# Patient Record
Sex: MALE | Race: WHITE | NOT HISPANIC OR LATINO | Employment: FULL TIME | URBAN - METROPOLITAN AREA
[De-identification: names, ages, dates, MRNs, and addresses within clinical notes are randomized per-mention and may not be internally consistent; named-entity substitution may affect disease eponyms.]

---

## 2017-01-31 ENCOUNTER — OFFICE VISIT (OUTPATIENT)
Dept: NEUROLOGY | Facility: MEDICAL CENTER | Age: 42
End: 2017-01-31
Payer: COMMERCIAL

## 2017-01-31 VITALS
HEIGHT: 69 IN | DIASTOLIC BLOOD PRESSURE: 68 MMHG | SYSTOLIC BLOOD PRESSURE: 122 MMHG | OXYGEN SATURATION: 91 % | WEIGHT: 178 LBS | BODY MASS INDEX: 26.36 KG/M2 | HEART RATE: 82 BPM | TEMPERATURE: 99 F

## 2017-01-31 PROCEDURE — 99214 OFFICE O/P EST MOD 30 MIN: CPT | Performed by: PSYCHIATRY & NEUROLOGY

## 2017-01-31 RX ORDER — OXCARBAZEPINE 300 MG/1
300 TABLET, FILM COATED ORAL 2 TIMES DAILY
Qty: 180 TAB | Refills: 1 | Status: SHIPPED | OUTPATIENT
Start: 2017-01-31 | End: 2017-07-28 | Stop reason: SDUPTHER

## 2017-01-31 NOTE — MR AVS SNAPSHOT
"        Amadeo Fernandez   2017 8:40 AM   Office Visit   MRN: [id number]    Department:  [facility]   Dept Phone:  [phone]    Description:  Male : 1975   Provider:  Yue Cheng M.D.           Reason for Visit     Follow-Up spells      Allergies as of 2017     No Known Allergies      You were diagnosed with     Spells   [[id number]]         Vital Signs     Blood Pressure Pulse Temperature Height Weight Body Mass Index    122/68 mmHg 82 37.2 °C (99 °F) 1.753 m (5' 9.02\") 80.74 kg (178 lb) 26.27 kg/m2    Oxygen Saturation                   91%           Basic Information     Date Of Birth Sex Race Ethnicity Preferred Language    1975 Male White Non- English      Your appointments     2017  9:30 AM   EST IVIG with RN 3   [facility] ([facility])    [address] [facility] [address]  [place] [id number] [phone]            2017  8:20 AM   Follow Up Visit with Yue Cheng M.D.   [facility] Neu[facility] (--)    [address] [place] [address], [address]  [place] [id number] [phone]           You will be receiving a confirmation call a few days before your appointment from our automated call confirmation system.              Problem List              ICD-10-CM Priority Class Noted - Resolved    Spells R68.89   2016 - Present      Health Maintenance        Date Due Completion Dates    IMM DTaP/Tdap/Td Vaccine (1 - Tdap) 10/17/1996 ---    IMM INFLUENZA (1) 2016 ---            Current Immunizations     No immunizations on file.      Below and/or attached are the medications your provider expects you to take. Review all of your home medications and newly ordered medications with your provider and/or pharmacist. Follow medication instructions as directed by your provider and/or pharmacist. Please keep your medication list with you and share with your provider. Update the information when medications are discontinued, doses are changed, or new medications (including " over-the-counter products) are added; and carry medication information at all times in the event of emergency situations     Allergies:  No Known Allergies          Medications  Valid as of: January 31, 2017 -  8:58 AM    Generic Name Brand Name Tablet Size Instructions for use    Albuterol   Inhale  by mouth.        Cetirizine HCl (Cap) Cetirizine HCl 10 MG Take  by mouth.        NS SOLN 60 mL with albuterol 2.5 mg/0.5 mL NEBU 5 mL   5 mg/hr by Nebulization route.        OXcarbazepine (Tab) TRILEPTAL 300 MG Take 1 Tab by mouth 2 times a day.        Sertraline HCl (Tab) ZOLOFT 100 MG Take 150 mg by mouth every day.        .                 Medicines prescribed today were sent to:     [facility] - [place], [place] - [address]    [address] [place] [address]    Phone: [phone] Fax: [phone]    Open 24 Hours?: No      Medication refill instructions:       If your prescription bottle indicates you have medication refills left, it is not necessary to call your provider’s office. Please contact your pharmacy and they will refill your medication.    If your prescription bottle indicates you do not have any refills left, you may request refills at any time through one of the following ways: The online [employer] system (except Urgent Care), by calling your provider’s office, or by asking your pharmacy to contact your provider’s office with a refill request. Medication refills are processed only during regular business hours and may not be available until the next business day. Your provider may request additional information or to have a follow-up visit with you prior to refilling your medication.   *Please Note: Medication refills are assigned a new Rx number when refilled electronically. Your pharmacy may indicate that no refills were authorized even though a new prescription for the same medication is available at the pharmacy. Please request the medicine by name with the pharmacy before contacting your  provider for a refill.        Referral     A referral request has been sent to our patient care coordination department. Please allow 3-5 business days for us to process this request and contact you either by phone or mail. If you do not hear from us by the 5th business day, please call us at [phone].        Instructions      IMPRESSION:    1. Two spells of shaking and falls-- the first one was 2005, the last one was Nov 2015-- patient denied passing out  2.  Hx of Brain contusion  3.  One episode of urine incontinence followed by tingling of limbs yesterday  4.  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

## 2017-01-31 NOTE — PATIENT INSTRUCTIONS
IMPRESSION:    1. Two spells of shaking and falls-- the first one was 2005, the last one was Nov 2015-- patient denied passing out  2.  Hx of Brain contusion  3.  One episode of urine incontinence followed by tingling of limbs yesterday  4.  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

## 2017-01-31 NOTE — PROGRESS NOTES
NEUROLOGY NOTE    Referring Physician  Dr Man    CHIEF COMPLAINT:    No more spells with trileptal    One episode of falling and shaking-- 2005 and Nov 2015-- lasting for a couple of seconds   Since last visit, he developed one spell-- all limbs were tingling and urine intolerance  Chief Complaint   Patient presents with   • Follow-Up     spells       PRESENT ILLNESS:   No more spells with trileptal    He did not enjoy the zonegran well-- zonegran 200mg HS made him not able to think clearly    He developed one spell-- all limbs were tingling and urine intolerance-- he denied passing out    The last scalp EEG and MRI were not remarkable    One episode of falling and shaking-- 2005 and Nov 2015-- lasting for a couple of seconds   Hx of brain concussion---6 times --the worse one, he passed out for 2 days  He thought that could be not associated with loss of consciciouness    He was put on keppra--he took it once and did not feel right and stopped by himself    PAST MEDICAL HISTORY:  No past medical history on file.    PAST SURGICAL HISTORY:  No past surgical history on file.    FAMILY HISTORY:  No family history on file.    SOCIAL HISTORY:  Social History     Social History   • Marital Status:      Spouse Name: N/A   • Number of Children: N/A   • Years of Education: N/A     Occupational History   • Not on file.     Social History Main Topics   • Smoking status: Not on file   • Smokeless tobacco: Not on file   • Alcohol Use: Not on file   • Drug Use: Not on file   • Sexual Activity: Not on file     Other Topics Concern   • Not on file     Social History Narrative   • No narrative on file     ALLERGIES:  No Known Allergies  TOBHX  History   Smoking status   • Not on file   Smokeless tobacco   • Not on file     ALCHX  History   Alcohol Use: Not on file     DRUGHX  History   Drug Use Not on file           MEDICATIONS:  Current Outpatient Prescriptions   Medication   • oxcarbazepine (TRILEPTAL) 300 MG Tab   •  Cetirizine HCl 10 MG Cap   • sertraline (ZOLOFT) 100 MG Tab   • NS SOLN 60 mL with albuterol 2.5 mg/0.5 mL NEBU 5 mL   • ALBUTEROL INH     No current facility-administered medications for this visit.       REVIEW OF SYSTEM:    Constitutional: Denies fevers, Denies weight changes   Eyes: Denies changes in vision, no eye pain   Ears/Nose/Throat/Mouth: Denies nasal congestion or sore throat   Cardiovascular: Denies chest pain or palpitations   Respiratory: Denies SOB.   Gastrointestinal/Hepatic: Denies abdominal pain, nausea, vomiting, diarrhea, constipation or GI bleeding   Genitourinary: Denies bladder dysfunction, dysuria or frequency   Musculoskeletal/Rheum: Denies joint pain and swelling   Skin/Breast: Denies rash, denies breast lumps or discharge   Neurological: 2 spells of shaking and falling , head injury before  Psychiatric: denies mood disorder   Endocrine: denies hx of diabetes or thyroid dysfunction   Heme/Oncology/Lymph Nodes: Denies enlarged lymph nodes, denies brusing or known bleeding disorder   Allergic/Immunologic: Denies hx of allergies         PHYSICAL AND NEUROLOGICAL EXMAINATIONS:  VITAL SIGNS: There were no vitals taken for this visit.  CURRENT WEIGHT:   BMI: There is no weight on file to calculate BMI.  PREVIOUS WEIGHTS:  Wt Readings from Last 25 Encounters:   11/08/16 80.6 kg (177 lb 11.1 oz)   07/07/16 80.287 kg (177 lb)   03/04/16 78.472 kg (173 lb)   01/27/16 75.751 kg (167 lb)       General appearance of patient: WDWN(+) NAD(+)    EYES  o Fundus : Papilledem(-) Exudates(-) Hemorrhage(-)  Nervous System  Orientation to time, place and person(+)  Memory normal(-) Recall 1/3  Language: aphasia(-)  Knowledge: past(+) Current(+)  Attention(+)  Cranial Nerves  • Nerve 2: intact  • Nerve 3,4,6: intact  • Nerve 5 : intact  • Nerve 7: intact  • Nerve 8: intact  • Nerve 9 & 10: intact  • Nerve 11: intact  • Nerve 12: intact  Muscle Power and muscle tone: symmetric, normal in upper and lower  Sensory  System: Pin sensation intact(+)  Reflexes: symmetric throughout  Cerebellar Function FNP normal   Gait : Steady(+) TandemGait steady(+)  Heart and Vascular  Peripheral Vasucular system : Edema (-) Swelling(-)  RHB, Breathing sound clear  abdomen bowel sound normoactive  Extremities freely moveable  Joints no contracture     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 Two spells of shaking and falls-- the first one was 2005, the last one was Nov 2015-- patient denied passing out  2.  Hx of Brain contusion  3.  One episode of urine incontinence followed by tingling of limbs yesterday  4.  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   Clinical correlation may help.    EEG 2/17/2016--- not remarkable in my opinions--- different interpretation than that of Dr Bloch  However, normal scalp EEG does not exclude diagnosis of seizure    CLINICAL INTERPRETATION: This is an abnormal electroencephalogram due to the   left temporal sharp wave which may be indicative of underlying temporal lobe   epilepsy. Clinical correlation is  required.  ____________________________________  MELISSA P. BLOCH, MD          MRI of brain 2/4/2016-- not remarkable

## 2017-02-07 ENCOUNTER — OUTPATIENT INFUSION SERVICES (OUTPATIENT)
Dept: ONCOLOGY | Facility: MEDICAL CENTER | Age: 42
End: 2017-02-07
Attending: NURSE PRACTITIONER
Payer: COMMERCIAL

## 2017-02-07 NOTE — PROGRESS NOTES
Pt did not show for appt. Pt reports that MD office was supposed to cancel appts, no longer receiving IVIG.

## 2017-02-21 ENCOUNTER — HOSPITAL ENCOUNTER (OUTPATIENT)
Dept: LAB | Facility: MEDICAL CENTER | Age: 42
End: 2017-02-21
Attending: NURSE PRACTITIONER
Payer: COMMERCIAL

## 2017-02-21 PROCEDURE — 36415 COLL VENOUS BLD VENIPUNCTURE: CPT

## 2017-02-21 PROCEDURE — 82785 ASSAY OF IGE: CPT

## 2017-02-21 PROCEDURE — 82784 ASSAY IGA/IGD/IGG/IGM EACH: CPT | Mod: 91

## 2017-02-21 PROCEDURE — 82784 ASSAY IGA/IGD/IGG/IGM EACH: CPT

## 2017-02-23 LAB
IGA SERPL-MCNC: 177 MG/DL (ref 68–408)
IGE SERPL-ACNC: 84 KU/L
IGG SERPL-MCNC: 655 MG/DL (ref 768–1632)
IGM SERPL-MCNC: 40 MG/DL (ref 35–263)

## 2017-03-31 ENCOUNTER — NON-PROVIDER VISIT (OUTPATIENT)
Dept: NEUROLOGY | Facility: MEDICAL CENTER | Age: 42
End: 2017-03-31
Payer: COMMERCIAL

## 2017-03-31 PROCEDURE — 95816 EEG AWAKE AND DROWSY: CPT | Performed by: PSYCHIATRY & NEUROLOGY

## 2017-03-31 NOTE — MR AVS SNAPSHOT
Amadeo Fernandez   3/31/2017 7:30 AM   Non-Provider Visit   MRN: [id number]    Department:  [facility]   Dept Phone:  [phone]    Description:  Male : 1975   [place]  [facility]           Reason for Visit     Procedure           Allergies as of 3/31/2017     No Known Allergies      You were diagnosed with     Spells   [[id number]]         Basic Information     Date Of Birth Sex Race Ethnicity Preferred Language    1975 Male White Non- English      Your appointments     2017 11:00 AM   Follow Up Visit with Olamide Judd PA-C   [facility] Neurology (--)    [address], [address]  [facility] [address] [phone]           You will be receiving a confirmation call a few days before your appointment from our automated call confirmation system.            2017 10:00 AM   Follow Up Visit with Yue Cheng M.D.   [facility] (--)    [address] [facility], [address]  [place] [address] [phone]           You will be receiving a confirmation call a few days before your appointment from our automated call confirmation system.              Problem List              ICD-10-CM Priority Class Noted - Resolved    Spells R68.89   2016 - Present      Health Maintenance        Date Due Completion Dates    IMM DTaP/Tdap/Td Vaccine (1 - Tdap) 10/17/1996 ---    IMM INFLUENZA (1) 2016 ---            Current Immunizations     No immunizations on file.      Below and/or attached are the medications your provider expects you to take. Review all of your home medications and newly ordered medications with your provider and/or pharmacist. Follow medication instructions as directed by your provider and/or pharmacist. Please keep your medication list with you and share with your provider. Update the information when medications are discontinued, doses are changed, or new medications (including over-the-counter products) are added; and  carry medication information at all times in the event of emergency situations     Allergies:  No Known Allergies          Medications  Valid as of: March 31, 2017 -  2:06 PM    Generic Name Brand Name Tablet Size Instructions for use    Albuterol   Inhale  by mouth.        Cetirizine HCl (Cap) Cetirizine HCl 10 MG Take  by mouth.        NS SOLN 60 mL with albuterol 2.5 mg/0.5 mL NEBU 5 mL   5 mg/hr by Nebulization route.        OXcarbazepine (Tab) TRILEPTAL 300 MG Take 1 Tab by mouth 2 times a day.        Sertraline HCl (Tab) ZOLOFT 100 MG Take 150 mg by mouth every day.        .                 Medicines prescribed today were sent to:     [facility] - [place], [place] - [address]    [address] [place] [address]    Phone: [phone] Fax: [phone]    Open 24 Hours?: No      Medication refill instructions:       If your prescription bottle indicates you have medication refills left, it is not necessary to call your provider’s office. Please contact your pharmacy and they will refill your medication.    If your prescription bottle indicates you do not have any refills left, you may request refills at any time through one of the following ways: The online [employer] system (except Urgent Care), by calling your provider’s office, or by asking your pharmacy to contact your provider’s office with a refill request. Medication refills are processed only during regular business hours and may not be available until the next business day. Your provider may request additional information or to have a follow-up visit with you prior to refilling your medication.   *Please Note: Medication refills are assigned a new Rx number when refilled electronically. Your pharmacy may indicate that no refills were authorized even though a new prescription for the same medication is available at the pharmacy. Please request the medicine by name with the pharmacy before contacting your provider for a refill.           [employer]  Status: Patient Declined

## 2017-04-03 NOTE — PROCEDURES
DATE OF SERVICE:  03/31/2017    ROUTINE ELECTROENCEPHALOGRAM REPORT        INDICATION:       The patient has a history of seizure.  Memory loss and increased myoclonus/ jerking in sleep.    TECHNIQUE: 30 channel routine electroencephalogram (EEG) was performed in accordance with the international 10-20 system. The study was reviewed in bipolar and referential montages. The recording examined the patient during wakeful and drowsy state(s).     DESCRIPTION OF THE RECORD:      The EEG background consists of posterior dominant alpha rhythm 10-11 Hz, alpha  rhythm, normal reactivity to eye opening.  There are no epileptiform   discharges.  There are no focal data slowing.  Stage II sleep was obtained.    Activation procedure did not produce any new abnormality.    ACTIVATION PROCEDURES:      Hyperventilation and Photic Stimulation were done    ICTAL AND/OR INTERICTAL FINDINGS:    No focal or generalized epileptiform activity noted. No regional slowing was seen during this routine study.  No clinical events or seizures were reported or recorded during the study.      EKG: sampling of the EKG recording demonstrated sinus rhythm.        INTERPRETATION:        INTERPRETATION:  This routine scalp EEG is still within normal limit.    Of note, unremarkable EEG does not completely exclude the diagnosis  of seizures since seizure is an episodic phenomena.  Clinical correlation may help     If clinical suspicion of seizure remains high.  Prolonged outpatient EEG   monitoring may be of help.           ____________________________________     MD RICK WILSON / BRIAN    DD:  04/03/2017 00:20:42  DT:  04/03/2017 01:36:40    D#:  [id number]  Job#:  [id number]

## 2017-04-11 ENCOUNTER — OFFICE VISIT (OUTPATIENT)
Dept: NEUROLOGY | Facility: MEDICAL CENTER | Age: 42
End: 2017-04-11
Payer: COMMERCIAL

## 2017-04-11 PROCEDURE — 99213 OFFICE O/P EST LOW 20 MIN: CPT | Performed by: PHYSICIAN ASSISTANT

## 2017-04-11 NOTE — MR AVS SNAPSHOT
Amadeo Fernandez   2017 11:00 AM   Office Visit   MRN: [id number]    Department:  [facility]   Dept Phone:  [phone]    Description:  Male : 1975   Provider:  Olamide Judd PA-C           Reason for Visit     Seizure DMV paper work      Allergies as of 2017     No Known Allergies      Basic Information     Date Of Birth Sex Race Ethnicity Preferred Language    1975 Male White Non- English      Your appointments     2017 10:00 AM   Follow Up Visit with Yue Cheng M.D.   [facility] Neurology (--)    [address] [facility], [address]  [facility] [address] [phone]           You will be receiving a confirmation call a few days before your appointment from our automated call confirmation system.              Problem List              ICD-10-CM Priority Class Noted - Resolved    Spells R68.89   2016 - Present      Health Maintenance        Date Due Completion Dates    IMM DTaP/Tdap/Td Vaccine (1 - Tdap) 10/17/1996 ---            Current Immunizations     No immunizations on file.      Below and/or attached are the medications your provider expects you to take. Review all of your home medications and newly ordered medications with your provider and/or pharmacist. Follow medication instructions as directed by your provider and/or pharmacist. Please keep your medication list with you and share with your provider. Update the information when medications are discontinued, doses are changed, or new medications (including over-the-counter products) are added; and carry medication information at all times in the event of emergency situations     Allergies:  No Known Allergies          Medications  Valid as of: 2017 - 11:10 AM    Generic Name Brand Name Tablet Size Instructions for use    Albuterol   Inhale  by mouth.        Cetirizine HCl (Cap) Cetirizine HCl 10 MG Take  by mouth.        NS SOLN 60 mL with albuterol 2.5 mg/0.5 mL NEBU 5 mL   5 mg/hr  by Nebulization route.        OXcarbazepine (Tab) TRILEPTAL 300 MG Take 1 Tab by mouth 2 times a day.        Sertraline HCl (Tab) ZOLOFT 100 MG Take 150 mg by mouth every day.        .                 Medicines prescribed today were sent to:     [facility] - [place] [place] [facility] [address]    [address] [place] [address]    Phone: [phone] Fax: [phone]    Open 24 Hours?: No      Medication refill instructions:       If your prescription bottle indicates you have medication refills left, it is not necessary to call your provider’s office. Please contact your pharmacy and they will refill your medication.    If your prescription bottle indicates you do not have any refills left, you may request refills at any time through one of the following ways: The online [facility] system (except Urgent Care), by calling your provider’s office, or by asking your pharmacy to contact your provider’s office with a refill request. Medication refills are processed only during regular business hours and may not be available until the next business day. Your provider may request additional information or to have a follow-up visit with you prior to refilling your medication.   *Please Note: Medication refills are assigned a new Rx number when refilled electronically. Your pharmacy may indicate that no refills were authorized even though a new prescription for the same medication is available at the pharmacy. Please request the medicine by name with the pharmacy before contacting your provider for a refill.           [employer] Status: Patient Declined

## 2017-04-11 NOTE — PROGRESS NOTES
Cc:  - abnormal spell    Established patient of [facility]  who suffers from spells/possible seizures  Here today for completion of paperwork for FMLA/disability/related to their condition.    They report to me that they are > 1 year from their last episode and are compliant with their medication.    Review of the medical chart and interviewing patient, I completed paperwork and it is scanned into media.     RTC as previously scheduled.    Total time with this visit: 15    Minutes face-to-face with patient. More than 50% of this visit was spent reviewing medical chart and speaking with the patient about their condition and how it affects their ability to do their job.

## 2017-04-17 ENCOUNTER — OFFICE VISIT (OUTPATIENT)
Dept: NEUROLOGY | Facility: MEDICAL CENTER | Age: 42
End: 2017-04-17
Payer: COMMERCIAL

## 2017-04-17 VITALS
TEMPERATURE: 97.8 F | RESPIRATION RATE: 16 BRPM | OXYGEN SATURATION: 95 % | HEIGHT: 69 IN | WEIGHT: 177.8 LBS | HEART RATE: 77 BPM | SYSTOLIC BLOOD PRESSURE: 122 MMHG | DIASTOLIC BLOOD PRESSURE: 60 MMHG | BODY MASS INDEX: 26.33 KG/M2

## 2017-04-17 PROCEDURE — 99214 OFFICE O/P EST MOD 30 MIN: CPT | Performed by: PSYCHIATRY & NEUROLOGY

## 2017-04-17 NOTE — PROGRESS NOTES
NEUROLOGY NOTE    Referring Physician  Dr Man    CHIEF COMPLAINT:    No more spells with trileptal    Had some trouble of memory problems for years    One episode of falling and shaking-- 2005 and Nov 2015-- lasting for a couple of seconds   Since last visit, he developed one spell-- all limbs were tingling and urine intolerance  Chief Complaint   Patient presents with   • Follow-Up     Spells       PRESENT ILLNESS:   No more spells with trileptal  Had some trouble of memory problems for years    He did not enjoy the zonegran well-- zonegran 200mg HS made him not able to think clearly    He developed one spell-- all limbs were tingling and urine intolerance-- he denied passing out    The last scalp EEG and MRI were not remarkable    One episode of falling and shaking-- 2005 and Nov 2015-- lasting for a couple of seconds   Hx of brain concussion---6 times --the worse one, he passed out for 2 days  He thought that could be not associated with loss of consciciouness    He was put on keppra--he took it once and did not feel right and stopped by himself    PAST MEDICAL HISTORY:  No past medical history on file.    PAST SURGICAL HISTORY:  No past surgical history on file.    FAMILY HISTORY:  No family history on file.    SOCIAL HISTORY:  Social History     Social History   • Marital Status:      Spouse Name: N/A   • Number of Children: N/A   • Years of Education: N/A     Occupational History   • Not on file.     Social History Main Topics   • Smoking status: Not on file   • Smokeless tobacco: Not on file   • Alcohol Use: Not on file   • Drug Use: Not on file   • Sexual Activity: Not on file     Other Topics Concern   • Not on file     Social History Narrative   • No narrative on file     ALLERGIES:  No Known Allergies  TOBHX  History   Smoking status   • Not on file   Smokeless tobacco   • Not on file     ALCHX  History   Alcohol Use: Not on file     DRUGHX  History   Drug Use Not on file  "          MEDICATIONS:  Current Outpatient Prescriptions   Medication   • oxcarbazepine (TRILEPTAL) 300 MG Tab   • Cetirizine HCl 10 MG Cap   • sertraline (ZOLOFT) 100 MG Tab   • NS SOLN 60 mL with albuterol 2.5 mg/0.5 mL NEBU 5 mL   • ALBUTEROL INH     No current facility-administered medications for this visit.       REVIEW OF SYSTEM:    Constitutional: Denies fevers, Denies weight changes   Eyes: Denies changes in vision, no eye pain   Ears/Nose/Throat/Mouth: Denies nasal congestion or sore throat   Cardiovascular: Denies chest pain or palpitations   Respiratory: Denies SOB.   Gastrointestinal/Hepatic: Denies abdominal pain, nausea, vomiting, diarrhea, constipation or GI bleeding   Genitourinary: Denies bladder dysfunction, dysuria or frequency   Musculoskeletal/Rheum: Denies joint pain and swelling   Skin/Breast: Denies rash, denies breast lumps or discharge   Neurological: 2 spells of shaking and falling , head injury before  Psychiatric: denies mood disorder   Endocrine: denies hx of diabetes or thyroid dysfunction   Heme/Oncology/Lymph Nodes: Denies enlarged lymph nodes, denies brusing or known bleeding disorder   Allergic/Immunologic: Denies hx of allergies         PHYSICAL AND NEUROLOGICAL EXMAINATIONS:  VITAL SIGNS: /60 mmHg  Pulse 77  Temp(Src) 36.6 °C (97.8 °F)  Resp 16  Ht 1.753 m (5' 9.02\")  Wt 80.65 kg (177 lb 12.8 oz)  BMI 26.24 kg/m2  SpO2 95%  CURRENT WEIGHT:   BMI: Body mass index is 26.24 kg/(m^2).  PREVIOUS WEIGHTS:  Wt Readings from Last 25 Encounters:   04/17/17 80.65 kg (177 lb 12.8 oz)   01/31/17 80.74 kg (178 lb)   11/08/16 80.6 kg (177 lb 11.1 oz)   07/07/16 80.287 kg (177 lb)   03/04/16 78.472 kg (173 lb)   01/27/16 75.751 kg (167 lb)       General appearance of patient: WDWN(+) NAD(+)    EYES  o Fundus : Papilledem(-) Exudates(-) Hemorrhage(-)  Nervous System  Orientation to time, place and person(+)  Memory normal(-) Recall 1/3  Language: aphasia(-)  Knowledge: past(+) " Current(+)  Attention(+)  Cranial Nerves  • Nerve 2: intact  • Nerve 3,4,6: intact  • Nerve 5 : intact  • Nerve 7: intact  • Nerve 8: intact  • Nerve 9 & 10: intact  • Nerve 11: intact  • Nerve 12: intact  Muscle Power and muscle tone: symmetric, normal in upper and lower  Sensory System: Pin sensation intact(+)  Reflexes: symmetric throughout  Cerebellar Function FNP normal   Gait : Steady(+) TandemGait steady(+)  Heart and Vascular  Peripheral Vasucular system : Edema (-) Swelling(-)  RHB, Breathing sound clear  abdomen bowel sound normoactive  Extremities freely moveable  Joints no contracture     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 Two spells of shaking and falls-- the first one was 2005, the last one was Nov 2015-- patient denied passing out  2.  Hx of Brain contusion  3.  One episode of urine incontinence followed by tingling of limbs yesterday--not lately  4.  Memory deterioration for years-- spells of day dreaming but not passing out-- not lately    PLAN/RECOMMENDATIONS:      Continue the Trileptal  300mg two times per day  Denied spells of passing out or starring    We also discuss about the benefits and risks about taking Trileptal vs not taking Trileptal  The spells of shaking did not happen since Trileptal was on board  Without Trileptal, the spells might come back though           Will see Childs in 6 months  If the memory continue to be a problem, we could offer repeat MRI of brain or even 5 days inpatient video EEG in the future if necessary         INTERPRETATION:        INTERPRETATION:  This routine scalp EEG is still within normal limit.    Of note, unremarkable EEG does not completely exclude the diagnosis  of seizures since seizure is an episodic phenomena.  Clinical correlation may help      If clinical suspicion of seizure remains high.  Prolonged outpatient EEG    monitoring may be  of help.           ____________________________________     MD RICK WILSON / BRIAN    DD:  04/03/2017 00:20:42  DT:  04/03/2017 01:36:40      MRI of brain 2/4/2016-- not remarkable

## 2017-04-17 NOTE — MR AVS SNAPSHOT
"        Amadeo Fernandez   2017 10:00 AM   Office Visit   MRN: [id number]    Department:  Neurology [facility]   Dept Phone:  [phone]    Description:  Male : 1975   Provider:  Yue Cheng M.D.           Reason for Visit     Follow-Up Spells      Allergies as of 2017     No Known Allergies      You were diagnosed with     Spells   [[id number]]         Vital Signs     Blood Pressure Pulse Temperature Respirations Height Weight    122/60 mmHg 77 36.6 °C (97.8 °F) 16 1.753 m (5' 9.02\") 80.65 kg (177 lb 12.8 oz)    Body Mass Index Oxygen Saturation                26.24 kg/m2 95%          Basic Information     Date Of Birth Sex Race Ethnicity Preferred Language    1975 Male White Non- English      Your appointments     Oct 16, 2017  9:20 AM   Follow Up Visit with Yue Cheng M.D.   [facility] (--)    [address] [place] [address], [address]  [facility] [id number] [phone]           You will be receiving a confirmation call a few days before your appointment from our automated call confirmation system.              Problem List              ICD-10-CM Priority Class Noted - Resolved    Spells R68.89   2016 - Present      Health Maintenance        Date Due Completion Dates    IMM DTaP/Tdap/Td Vaccine (1 - Tdap) 10/17/1996 ---            Current Immunizations     No immunizations on file.      Below and/or attached are the medications your provider expects you to take. Review all of your home medications and newly ordered medications with your provider and/or pharmacist. Follow medication instructions as directed by your provider and/or pharmacist. Please keep your medication list with you and share with your provider. Update the information when medications are discontinued, doses are changed, or new medications (including over-the-counter products) are added; and carry medication information at all times in the event of emergency situations     Allergies:  No Known Allergies       "   Medications  Valid as of: April 17, 2017 - 10:09 AM    Generic Name Brand Name Tablet Size Instructions for use    Albuterol   Inhale  by mouth.        Cetirizine HCl (Cap) Cetirizine HCl 10 MG Take  by mouth.        NS SOLN 60 mL with albuterol 2.5 mg/0.5 mL NEBU 5 mL   5 mg/hr by Nebulization route.        OXcarbazepine (Tab) TRILEPTAL 300 MG Take 1 Tab by mouth 2 times a day.        Sertraline HCl (Tab) ZOLOFT 100 MG Take 150 mg by mouth every day.        .                 Medicines prescribed today were sent to:     [facility] - [place], [place] - [address]    [address] [place] [address]    Phone: [phone] Fax: [phone]    Open 24 Hours?: No      Medication refill instructions:       If your prescription bottle indicates you have medication refills left, it is not necessary to call your provider’s office. Please contact your pharmacy and they will refill your medication.    If your prescription bottle indicates you do not have any refills left, you may request refills at any time through one of the following ways: The online [email or web address] system (except Urgent Care), by calling your provider’s office, or by asking your pharmacy to contact your provider’s office with a refill request. Medication refills are processed only during regular business hours and may not be available until the next business day. Your provider may request additional information or to have a follow-up visit with you prior to refilling your medication.   *Please Note: Medication refills are assigned a new Rx number when refilled electronically. Your pharmacy may indicate that no refills were authorized even though a new prescription for the same medication is available at the pharmacy. Please request the medicine by name with the pharmacy before contacting your provider for a refill.        Instructions      Mother suicide--- mother's grandmother also took suicide  Recall 3/3    IMPRESSION:    1. Two spells of shaking and  falls-- the first one was 2005, the last one was Nov 2015-- patient denied passing out  2.  Hx of Brain contusion  3.  One episode of urine incontinence followed by tingling of limbs yesterday--not lately  4.  Memory deterioration for years-- spells of day dreaming but not passing out-- not lately    PLAN/RECOMMENDATIONS:      Continue the Trileptal  300mg two times per day  Denied spells of passing out or starring    We also discuss about the benefits and risks about taking Trileptal vs not taking Trileptal  The spells of shaking did not happen since Trileptal was on board  Without Trileptal, the spells might come back though           Will see Childs in 6 months  If the memory continue to be a problem, we could offer repeat MRI of brain or even 5 days inpatient video EEG in the future if necessary         INTERPRETATION:        INTERPRETATION:  This routine scalp EEG is still within normal limit.    Of note, unremarkable EEG does not completely exclude the diagnosis  of seizures since seizure is an episodic phenomena.  Clinical correlation may help      If clinical suspicion of seizure remains high.  Prolonged outpatient EEG    monitoring may be of help.           ____________________________________     MD RICK WILSON / BRIAN    DD:  04/03/2017 00:20:42  DT:  04/03/2017 01:36:40      MRI of brain 2/4/2016-- not remarkable                MyChart Status: Patient Declined

## 2017-04-17 NOTE — PATIENT INSTRUCTIONS
Mother suicide--- mother's grandmother also took suicide  Recall 3/3    IMPRESSION:    1. Two spells of shaking and falls-- the first one was 2005, the last one was Nov 2015-- patient denied passing out  2.  Hx of Brain contusion  3.  One episode of urine incontinence followed by tingling of limbs yesterday--not lately  4.  Memory deterioration for years-- spells of day dreaming but not passing out-- not lately    PLAN/RECOMMENDATIONS:      Continue the Trileptal  300mg two times per day  Denied spells of passing out or starring    We also discuss about the benefits and risks about taking Trileptal vs not taking Trileptal  The spells of shaking did not happen since Trileptal was on board  Without Trileptal, the spells might come back though           Will see Childs in 6 months  If the memory continue to be a problem, we could offer repeat MRI of brain or even 5 days inpatient video EEG in the future if necessary         INTERPRETATION:        INTERPRETATION:  This routine scalp EEG is still within normal limit.    Of note, unremarkable EEG does not completely exclude the diagnosis  of seizures since seizure is an episodic phenomena.  Clinical correlation may help      If clinical suspicion of seizure remains high.  Prolonged outpatient EEG    monitoring may be of help.           ____________________________________     MD RICK WILSON / BRIAN    DD:  04/03/2017 00:20:42  DT:  04/03/2017 01:36:40      MRI of brain 2/4/2016-- not remarkable

## 2017-06-19 ENCOUNTER — HOSPITAL ENCOUNTER (OUTPATIENT)
Dept: LAB | Facility: MEDICAL CENTER | Age: 42
End: 2017-06-19
Attending: NURSE PRACTITIONER
Payer: COMMERCIAL

## 2017-06-19 LAB
25(OH)D3 SERPL-MCNC: 29 NG/ML (ref 30–100)
ALBUMIN SERPL BCP-MCNC: 4.3 G/DL (ref 3.2–4.9)
ALBUMIN/GLOB SERPL: 1.7 G/DL
ALP SERPL-CCNC: 56 U/L (ref 30–99)
ALT SERPL-CCNC: 16 U/L (ref 2–50)
ANION GAP SERPL CALC-SCNC: 6 MMOL/L (ref 0–11.9)
AST SERPL-CCNC: 15 U/L (ref 12–45)
BASOPHILS # BLD AUTO: 1.5 % (ref 0–1.8)
BASOPHILS # BLD: 0.08 K/UL (ref 0–0.12)
BILIRUB SERPL-MCNC: 0.3 MG/DL (ref 0.1–1.5)
BUN SERPL-MCNC: 13 MG/DL (ref 8–22)
CALCIUM SERPL-MCNC: 9.1 MG/DL (ref 8.5–10.5)
CHLORIDE SERPL-SCNC: 107 MMOL/L (ref 96–112)
CHOLEST SERPL-MCNC: 208 MG/DL (ref 100–199)
CO2 SERPL-SCNC: 24 MMOL/L (ref 20–33)
CREAT SERPL-MCNC: 0.87 MG/DL (ref 0.5–1.4)
EOSINOPHIL # BLD AUTO: 0.29 K/UL (ref 0–0.51)
EOSINOPHIL NFR BLD: 5.5 % (ref 0–6.9)
ERYTHROCYTE [DISTWIDTH] IN BLOOD BY AUTOMATED COUNT: 46.2 FL (ref 35.9–50)
GFR SERPL CREATININE-BSD FRML MDRD: >60 ML/MIN/1.73 M 2
GLOBULIN SER CALC-MCNC: 2.5 G/DL (ref 1.9–3.5)
GLUCOSE SERPL-MCNC: 114 MG/DL (ref 65–99)
HCT VFR BLD AUTO: 45.9 % (ref 42–52)
HDLC SERPL-MCNC: 58 MG/DL
HGB BLD-MCNC: 15.9 G/DL (ref 14–18)
IMM GRANULOCYTES # BLD AUTO: 0.03 K/UL (ref 0–0.11)
IMM GRANULOCYTES NFR BLD AUTO: 0.6 % (ref 0–0.9)
LDLC SERPL CALC-MCNC: 138 MG/DL
LYMPHOCYTES # BLD AUTO: 1.59 K/UL (ref 1–4.8)
LYMPHOCYTES NFR BLD: 30 % (ref 22–41)
MCH RBC QN AUTO: 32.3 PG (ref 27–33)
MCHC RBC AUTO-ENTMCNC: 34.6 G/DL (ref 33.7–35.3)
MCV RBC AUTO: 93.3 FL (ref 81.4–97.8)
MONOCYTES # BLD AUTO: 0.43 K/UL (ref 0–0.85)
MONOCYTES NFR BLD AUTO: 8.1 % (ref 0–13.4)
NEUTROPHILS # BLD AUTO: 2.88 K/UL (ref 1.82–7.42)
NEUTROPHILS NFR BLD: 54.3 % (ref 44–72)
NRBC # BLD AUTO: 0 K/UL
NRBC BLD AUTO-RTO: 0 /100 WBC
PLATELET # BLD AUTO: 230 K/UL (ref 164–446)
PMV BLD AUTO: 10.6 FL (ref 9–12.9)
POTASSIUM SERPL-SCNC: 4.2 MMOL/L (ref 3.6–5.5)
PROT SERPL-MCNC: 6.8 G/DL (ref 6–8.2)
RBC # BLD AUTO: 4.92 M/UL (ref 4.7–6.1)
SODIUM SERPL-SCNC: 137 MMOL/L (ref 135–145)
TRIGL SERPL-MCNC: 59 MG/DL (ref 0–149)
TSH SERPL DL<=0.005 MIU/L-ACNC: 0.7 UIU/ML (ref 0.3–3.7)
WBC # BLD AUTO: 5.3 K/UL (ref 4.8–10.8)

## 2017-06-19 PROCEDURE — 36415 COLL VENOUS BLD VENIPUNCTURE: CPT

## 2017-06-19 PROCEDURE — 82784 ASSAY IGA/IGD/IGG/IGM EACH: CPT

## 2017-06-19 PROCEDURE — 80061 LIPID PANEL: CPT

## 2017-06-19 PROCEDURE — 82306 VITAMIN D 25 HYDROXY: CPT

## 2017-06-19 PROCEDURE — 82785 ASSAY OF IGE: CPT

## 2017-06-19 PROCEDURE — 82784 ASSAY IGA/IGD/IGG/IGM EACH: CPT | Mod: 91

## 2017-06-19 PROCEDURE — 85025 COMPLETE CBC W/AUTO DIFF WBC: CPT

## 2017-06-19 PROCEDURE — 80053 COMPREHEN METABOLIC PANEL: CPT

## 2017-06-19 PROCEDURE — 84443 ASSAY THYROID STIM HORMONE: CPT

## 2017-06-20 LAB
IGA SERPL-MCNC: 185 MG/DL (ref 68–408)
IGG SERPL-MCNC: 576 MG/DL (ref 768–1632)
IGM SERPL-MCNC: 39 MG/DL (ref 35–263)

## 2017-06-22 LAB — IGE SERPL-ACNC: 76 KU/L

## 2017-07-28 DIAGNOSIS — G40.909 SEIZURE CEREBRAL (HCC): ICD-10-CM

## 2017-07-28 RX ORDER — OXCARBAZEPINE 300 MG/1
300 TABLET, FILM COATED ORAL 2 TIMES DAILY
Qty: 180 TAB | Refills: 1 | Status: SHIPPED | OUTPATIENT
Start: 2017-07-28 | End: 2018-03-23 | Stop reason: SDUPTHER

## 2017-08-09 ENCOUNTER — RX ONLY (OUTPATIENT)
Age: 42
Setting detail: RX ONLY
End: 2017-08-09

## 2017-08-23 PROBLEM — D49.2 NEOPLASM OF UNSPECIFIED BEHAVIOR OF BONE, SOFT TISSUE, AND SKIN: Status: RESOLVED | Noted: 2017-08-09 | Resolved: 2017-08-23

## 2017-08-24 ENCOUNTER — OFFICE VISIT (OUTPATIENT)
Dept: NEUROLOGY | Facility: MEDICAL CENTER | Age: 42
End: 2017-08-24
Payer: COMMERCIAL

## 2017-08-24 VITALS
HEART RATE: 72 BPM | RESPIRATION RATE: 18 BRPM | HEIGHT: 69 IN | BODY MASS INDEX: 25.95 KG/M2 | DIASTOLIC BLOOD PRESSURE: 64 MMHG | SYSTOLIC BLOOD PRESSURE: 106 MMHG | WEIGHT: 175.2 LBS | TEMPERATURE: 98.4 F | OXYGEN SATURATION: 96 %

## 2017-08-24 DIAGNOSIS — R41.89 SPELL OF ALTERED COGNITION: ICD-10-CM

## 2017-08-24 DIAGNOSIS — G40.909 SEIZURE CEREBRAL (HCC): ICD-10-CM

## 2017-08-24 PROCEDURE — 99213 OFFICE O/P EST LOW 20 MIN: CPT | Performed by: PSYCHIATRY & NEUROLOGY

## 2017-08-24 RX ORDER — CETIRIZINE HYDROCHLORIDE 10 MG/1
TABLET ORAL
Refills: 3 | COMMUNITY
Start: 2017-07-11 | End: 2019-03-27

## 2017-08-24 ASSESSMENT — PATIENT HEALTH QUESTIONNAIRE - PHQ9: CLINICAL INTERPRETATION OF PHQ2 SCORE: 0

## 2017-08-24 NOTE — PROGRESS NOTES
NEUROLOGY NOTE    Referring Physician  Dr Man    CHIEF COMPLAINT:    No more spells with trileptal    Had some trouble of memory problems for years    One episode of falling and shaking-- 2005 and Nov 2015-- lasting for a couple of seconds   Since last visit, he developed one spell-- all limbs were tingling and urine intolerance  Chief Complaint   Patient presents with   • Follow-Up     Spells       PRESENT ILLNESS:       No more spells with trileptal  Had some trouble of memory problems for years    He did not enjoy the zonegran well-- zonegran 200mg HS made him not able to think clearly    He developed one spell-- all limbs were tingling and urine intolerance-- he denied passing out    The last scalp EEG and MRI were not remarkable    One episode of falling and shaking-- 2005 and Nov 2015-- lasting for a couple of seconds   Hx of brain concussion---6 times --the worse one, he passed out for 2 days  He thought that could be not associated with loss of consciciouness    He was put on keppra--he took it once and did not feel right and stopped by himself    PAST MEDICAL HISTORY:  History reviewed. No pertinent past medical history.    PAST SURGICAL HISTORY:  History reviewed. No pertinent past surgical history.    FAMILY HISTORY:  History reviewed. No pertinent family history.    SOCIAL HISTORY:  Social History     Social History   • Marital Status:      Spouse Name: N/A   • Number of Children: N/A   • Years of Education: N/A     Occupational History   • Not on file.     Social History Main Topics   • Smoking status: Never Smoker    • Smokeless tobacco: Current User     Types: Chew   • Alcohol Use: No   • Drug Use: No   • Sexual Activity: Yes     Other Topics Concern   • Not on file     Social History Narrative   • No narrative on file     ALLERGIES:  No Known Allergies  TOBHX  History   Smoking status   • Never Smoker    Smokeless tobacco   • Current User   • Types: Chew     ALCHX  History   Alcohol Use No  "    DRUGHX  History   Drug Use No           MEDICATIONS:  Current Outpatient Prescriptions   Medication   • cetirizine (ZYRTEC) 10 MG Tab   • oxcarbazepine (TRILEPTAL) 300 MG Tab   • sertraline (ZOLOFT) 100 MG Tab   • ALBUTEROL INH   • Cetirizine HCl 10 MG Cap   • NS SOLN 60 mL with albuterol 2.5 mg/0.5 mL NEBU 5 mL     No current facility-administered medications for this visit.       REVIEW OF SYSTEM:    Constitutional: Denies fevers, Denies weight changes   Eyes: Denies changes in vision, no eye pain   Ears/Nose/Throat/Mouth: Denies nasal congestion or sore throat   Cardiovascular: Denies chest pain or palpitations   Respiratory: Denies SOB.   Gastrointestinal/Hepatic: Denies abdominal pain, nausea, vomiting, diarrhea, constipation or GI bleeding   Genitourinary: Denies bladder dysfunction, dysuria or frequency   Musculoskeletal/Rheum: Denies joint pain and swelling   Skin/Breast: Denies rash, denies breast lumps or discharge   Neurological: 2 spells of shaking and falling , head injury before  Psychiatric: denies mood disorder   Endocrine: denies hx of diabetes or thyroid dysfunction   Heme/Oncology/Lymph Nodes: Denies enlarged lymph nodes, denies brusing or known bleeding disorder   Allergic/Immunologic: Denies hx of allergies         PHYSICAL AND NEUROLOGICAL EXMAINATIONS:  VITAL SIGNS: /64 mmHg  Pulse 72  Temp(Src) 36.9 °C (98.4 °F)  Resp 18  Ht 1.753 m (5' 9\")  Wt 79.47 kg (175 lb 3.2 oz)  BMI 25.86 kg/m2  SpO2 96%  CURRENT WEIGHT:   BMI: Body mass index is 25.86 kg/(m^2).  PREVIOUS WEIGHTS:  Wt Readings from Last 25 Encounters:   08/24/17 79.47 kg (175 lb 3.2 oz)   04/17/17 80.65 kg (177 lb 12.8 oz)   01/31/17 80.74 kg (178 lb)   11/08/16 80.6 kg (177 lb 11.1 oz)   07/07/16 80.287 kg (177 lb)   03/04/16 78.472 kg (173 lb)   01/27/16 75.751 kg (167 lb)       General appearance of patient: WDWN(+) NAD(+)    EYES  o Fundus : Papilledem(-) Exudates(-) Hemorrhage(-)  Nervous System  Orientation to " time, place and person(+)  Memory normal(-) Recall 1/3  Language: aphasia(-)  Knowledge: past(+) Current(+)  Attention(+)  Cranial Nerves  • Nerve 2: intact  • Nerve 3,4,6: intact  • Nerve 5 : intact  • Nerve 7: intact  • Nerve 8: intact  • Nerve 9 & 10: intact  • Nerve 11: intact  • Nerve 12: intact  Muscle Power and muscle tone: symmetric, normal in upper and lower  Sensory System: Pin sensation intact(+)  Reflexes: symmetric throughout  Cerebellar Function FNP normal   Gait : Steady(+) TandemGait steady(+)  Heart and Vascular  Peripheral Vasucular system : Edema (-) Swelling(-)  RHB, Breathing sound clear  abdomen bowel sound normoactive  Extremities freely moveable  Joints no contracture     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 Two spells of shaking and falls-- the first one was 2005, the last one was Nov 2015-- patient denied passing out  2.  Hx of Brain contusion  3.  One episode of urine incontinence followed by tingling of limbs yesterday--not lately  4.  Memory deterioration for years-- spells of day dreaming but not passing out-- not lately    PLAN/RECOMMENDATIONS:      Continue the Trileptal  300mg two times per day  Denied spells of passing out or starring    We also discuss about the benefits and risks about taking Trileptal vs not taking Trileptal  The spells of shaking did not happen since Trileptal was on board  Without Trileptal, the spells might come back though           Will see Amadeo in 12 months  If the memory continue to be a problem, we could offer repeat MRI of brain or even 5 days inpatient video EEG in the future if necessary         INTERPRETATION:        INTERPRETATION:  This routine scalp EEG is still within normal limit.    Of note, unremarkable EEG does not completely exclude the diagnosis  of seizures since seizure is an episodic phenomena.  Clinical correlation may  help      If clinical suspicion of seizure remains high.  Prolonged outpatient EEG    monitoring may be of help.           ____________________________________     MD RICK WILSON / BRIAN    DD:  04/03/2017 00:20:42  DT:  04/03/2017 01:36:40      MRI of brain 2/4/2016-- not remarkable

## 2017-08-24 NOTE — MR AVS SNAPSHOT
"        Amadeo Fernandez   2017 11:20 AM   Office Visit   MRN: [id number]    Department:  [facility]   Dept Phone:  [phone]    Description:  Male : 1975   Provider:  Yue Cheng M.D.           Reason for Visit     Follow-Up Spells      Allergies as of 2017     No Known Allergies      You were diagnosed with     Seizure cerebral   [[id number]]       Spell of altered cognition   [[id number]]         Vital Signs     Blood Pressure Pulse Temperature Respirations Height Weight    106/64 mmHg 72 36.9 °C (98.4 °F) 18 1.753 m (5' 9\") 79.47 kg (175 lb 3.2 oz)    Body Mass Index Oxygen Saturation Smoking Status             25.86 kg/m2 96% Never Smoker          Basic Information     Date Of Birth Sex Race Ethnicity Preferred Language    1975 Male White Non- English      Problem List              ICD-10-CM Priority Class Noted - Resolved    Spell of altered cognition R41.89   2016 - Present      Health Maintenance        Date Due Completion Dates    IMM DTaP/Tdap/Td Vaccine (1 - Tdap) 10/17/1996 ---    IMM INFLUENZA (1) 2017 ---            Current Immunizations     No immunizations on file.      Below and/or attached are the medications your provider expects you to take. Review all of your home medications and newly ordered medications with your provider and/or pharmacist. Follow medication instructions as directed by your provider and/or pharmacist. Please keep your medication list with you and share with your provider. Update the information when medications are discontinued, doses are changed, or new medications (including over-the-counter products) are added; and carry medication information at all times in the event of emergency situations     Allergies:  No Known Allergies          Medications  Valid as of: 2017 - 11:41 AM    Generic Name Brand Name Tablet Size Instructions for use    Albuterol   Inhale  by mouth.        Cetirizine HCl (Cap) Cetirizine HCl 10 MG Take  " by mouth.        Cetirizine HCl (Tab) ZYRTEC 10 MG         NS SOLN 60 mL with albuterol 2.5 mg/0.5 mL NEBU 5 mL   5 mg/hr by Nebulization route.        OXcarbazepine (Tab) TRILEPTAL 300 MG Take 1 Tab by mouth 2 times a day.        Sertraline HCl (Tab) ZOLOFT 100 MG Take 150 mg by mouth every day.        .                 Medicines prescribed today were sent to:     [facility]  [place] - [address] [facility].    [address] [place] [address]    Phone: [phone] Fax: [phone]    Open 24 Hours?: No      Medication refill instructions:       If your prescription bottle indicates you have medication refills left, it is not necessary to call your provider’s office. Please contact your pharmacy and they will refill your medication.    If your prescription bottle indicates you do not have any refills left, you may request refills at any time through one of the following ways: The online Potential system (except Urgent Care), by calling your provider’s office, or by asking your pharmacy to contact your provider’s office with a refill request. Medication refills are processed only during regular business hours and may not be available until the next business day. Your provider may request additional information or to have a follow-up visit with you prior to refilling your medication.   *Please Note: Medication refills are assigned a new Rx number when refilled electronically. Your pharmacy may indicate that no refills were authorized even though a new prescription for the same medication is available at the pharmacy. Please request the medicine by name with the pharmacy before contacting your provider for a refill.           [facility]mohart Status: Patient Declined        Quit Tobacco Information     Do you want to quit using tobacco?    Quitting tobacco decreases risks of cancer, heart and lung disease, increases life expectancy, improves sense of taste and smell, and increases spending money, among other  benefits.    If you are thinking about quitting, we can help.  • [facility] Quit Tobacco Program: 159-283-1201  o Program occurs weekly for four weeks and includes pharmacist consultation on products to support quitting smoking or chewing tobacco. A provider referral is needed for pharmacist consultation.  • Tobacco Users Help Hotline: 1-800-QUIT-NOW (955-6808) or https://[place].quitlogix.org/  o Free, confidential telephone and online coaching for [place] residents. Sessions are designed on a schedule that is convenient for you. Eligible clients receive free nicotine replacement therapy.  • Nationally: www.smokefree.gov  o Information and professional assistance to support both immediate and long-term needs as you become, and remain, a non-smoker. Smokefree.gov allows you to choose the help that best fits your needs.

## 2017-08-28 ENCOUNTER — OFFICE VISIT (OUTPATIENT)
Dept: URGENT CARE | Facility: PHYSICIAN GROUP | Age: 42
End: 2017-08-28
Payer: COMMERCIAL

## 2017-08-28 VITALS
DIASTOLIC BLOOD PRESSURE: 70 MMHG | SYSTOLIC BLOOD PRESSURE: 114 MMHG | BODY MASS INDEX: 25.84 KG/M2 | HEART RATE: 70 BPM | OXYGEN SATURATION: 96 % | RESPIRATION RATE: 16 BRPM | WEIGHT: 175 LBS | TEMPERATURE: 97.9 F

## 2017-08-28 DIAGNOSIS — J45.901 ASTHMA EXACERBATION, MILD: ICD-10-CM

## 2017-08-28 DIAGNOSIS — J01.40 ACUTE PANSINUSITIS, RECURRENCE NOT SPECIFIED: ICD-10-CM

## 2017-08-28 DIAGNOSIS — H61.23 CERUMEN DEBRIS ON TYMPANIC MEMBRANE OF BOTH EARS: ICD-10-CM

## 2017-08-28 PROCEDURE — 69210 REMOVE IMPACTED EAR WAX UNI: CPT | Performed by: NURSE PRACTITIONER

## 2017-08-28 PROCEDURE — 99203 OFFICE O/P NEW LOW 30 MIN: CPT | Mod: 25 | Performed by: NURSE PRACTITIONER

## 2017-08-28 RX ORDER — METHYLPREDNISOLONE 4 MG/1
TABLET ORAL
Qty: 1 KIT | Refills: 0 | Status: SHIPPED | OUTPATIENT
Start: 2017-08-28 | End: 2018-03-29

## 2017-08-28 RX ORDER — AMOXICILLIN AND CLAVULANATE POTASSIUM 875; 125 MG/1; MG/1
1 TABLET, FILM COATED ORAL 2 TIMES DAILY
Qty: 14 TAB | Refills: 0 | Status: SHIPPED | OUTPATIENT
Start: 2017-08-28 | End: 2017-09-04

## 2017-08-28 RX ORDER — ALBUTEROL SULFATE 90 UG/1
2 AEROSOL, METERED RESPIRATORY (INHALATION) EVERY 6 HOURS PRN
Qty: 8.5 G | Refills: 0 | Status: SHIPPED | OUTPATIENT
Start: 2017-08-28 | End: 2019-01-21

## 2017-08-29 ASSESSMENT — ENCOUNTER SYMPTOMS
VOMITING: 0
SORE THROAT: 0
SPUTUM PRODUCTION: 0
WEAKNESS: 0
ABDOMINAL PAIN: 0
DIZZINESS: 0
DIARRHEA: 0
MYALGIAS: 0
ORTHOPNEA: 0
CONSTIPATION: 0
TINGLING: 0
SHORTNESS OF BREATH: 0
PALPITATIONS: 0
WHEEZING: 0
HEADACHES: 1
NAUSEA: 0
CHILLS: 0
FOCAL WEAKNESS: 0
EYE REDNESS: 0
NECK PAIN: 0
FEVER: 0
COUGH: 1
EYE DISCHARGE: 0
SENSORY CHANGE: 0

## 2017-08-29 NOTE — PROGRESS NOTES
"Subjective:      Amadeo Fernandez is a 41 y.o. male who presents with Sinus Problem (x1day sinus pressure, \"someting in lungs\")            HPI  Buck is a 41 year old male who is here for sinus problems since yesterday. States has had URI symptoms x 1 week but has had increased facial pressure with ear pressure, PND, sinus HA. Has intermittent nonproductive cough without SOB, chest tightness. Has h/o asthma and slight intermittent wheeze especially at night. Taking Claritin x 2 days, no nasal flushing or nasal spray. States has old  albuterol inhaler.    PMH:  has no past medical history on file.  MEDS:   Current Outpatient Prescriptions:   •  MethylPREDNISolone (MEDROL DOSEPAK) 4 MG Tablet Therapy Pack, Use as directed, Disp: 1 Kit, Rfl: 0  •  albuterol 108 (90 Base) MCG/ACT Aero Soln inhalation aerosol, Inhale 2 Puffs by mouth every 6 hours as needed for Shortness of Breath., Disp: 8.5 g, Rfl: 0  •  amoxicillin-clavulanate (AUGMENTIN) 875-125 MG Tab, Take 1 Tab by mouth 2 times a day for 7 days., Disp: 14 Tab, Rfl: 0  •  cetirizine (ZYRTEC) 10 MG Tab, , Disp: , Rfl: 3  •  oxcarbazepine (TRILEPTAL) 300 MG Tab, Take 1 Tab by mouth 2 times a day., Disp: 180 Tab, Rfl: 1  •  sertraline (ZOLOFT) 100 MG Tab, Take 150 mg by mouth every day., Disp: , Rfl:   •  Cetirizine HCl 10 MG Cap, Take  by mouth., Disp: , Rfl:   •  NS SOLN 60 mL with albuterol 2.5 mg/0.5 mL NEBU 5 mL, 5 mg/hr by Nebulization route., Disp: , Rfl:   •  ALBUTEROL INH, Inhale  by mouth., Disp: , Rfl:   ALLERGIES: No Known Allergies  SURGHX: No past surgical history on file.  SOCHX:  reports that he has never smoked. His smokeless tobacco use includes Chew. He reports that he does not drink alcohol or use drugs.  FH: Family history was reviewed, no pertinent findings to report    Review of Systems   Constitutional: Positive for malaise/fatigue. Negative for chills and fever.   HENT: Positive for congestion and ear pain. Negative for sore throat.  "   Eyes: Negative for discharge and redness.   Respiratory: Positive for cough. Negative for sputum production, shortness of breath and wheezing.    Cardiovascular: Negative for chest pain, palpitations and orthopnea.   Gastrointestinal: Negative for abdominal pain, constipation, diarrhea, nausea and vomiting.   Musculoskeletal: Negative for myalgias and neck pain.   Skin: Negative for itching and rash.   Neurological: Positive for headaches. Negative for dizziness, tingling, sensory change, focal weakness and weakness.   Endo/Heme/Allergies: Negative for environmental allergies.   All other systems reviewed and are negative.         Objective:     /70   Pulse 70   Temp 36.6 °C (97.9 °F)   Resp 16   Wt 79.4 kg (175 lb)   SpO2 96%   BMI 25.84 kg/m²      Physical Exam   Constitutional: He is oriented to person, place, and time. Vital signs are normal. He appears well-developed and well-nourished. He is active and cooperative.  Non-toxic appearance. He does not have a sickly appearance. He appears ill. No distress.   HENT:   Head: Normocephalic.   Right Ear: Hearing, external ear and ear canal normal. Tympanic membrane is injected.   Left Ear: Hearing, external ear and ear canal normal. Tympanic membrane is injected.   Nose: Mucosal edema, rhinorrhea and sinus tenderness present. Right sinus exhibits maxillary sinus tenderness and frontal sinus tenderness. Left sinus exhibits maxillary sinus tenderness and frontal sinus tenderness.   Mouth/Throat: Uvula is midline. Mucous membranes are dry. No uvula swelling. Posterior oropharyngeal erythema present. No posterior oropharyngeal edema.   Ears: Vaishali pinnae. Bilat external auditory canals with some occlusion with impacted cerumen. Procedure: Pt then prepped and lavaged by MA and residual wax curetted by Provider with resolution of impaction.   TM pearly gray with normal light reflex bilaterally.       Eyes: Conjunctivae and EOM are normal. Pupils are equal,  round, and reactive to light.   Neck: Normal range of motion. Neck supple.   Cardiovascular: Normal rate and regular rhythm.    Pulmonary/Chest: Effort normal and breath sounds normal. No accessory muscle usage. No respiratory distress. He has no decreased breath sounds. He has no wheezes. He has no rhonchi. He has no rales.   Lymphadenopathy:     He has no cervical adenopathy.   Neurological: He is alert and oriented to person, place, and time.   Skin: Skin is warm and dry. He is not diaphoretic.   Vitals reviewed.              Assessment/Plan:     1. Acute pansinusitis, recurrence not specified    - amoxicillin-clavulanate (AUGMENTIN) 875-125 MG Tab; Take 1 Tab by mouth 2 times a day for 7 days.  Dispense: 14 Tab; Refill: 0    2. Asthma exacerbation, mild    - MethylPREDNISolone (MEDROL DOSEPAK) 4 MG Tablet Therapy Pack; Use as directed  Dispense: 1 Kit; Refill: 0  - albuterol 108 (90 Base) MCG/ACT Aero Soln inhalation aerosol; Inhale 2 Puffs by mouth every 6 hours as needed for Shortness of Breath.  Dispense: 8.5 g; Refill: 0    3. Cerumen debris on tympanic membrane of both ears    - REMOVAL IMPACTED EAR WAX    Increase water intake  Get rest  May use Ibuprofen/Tylenol prn for any fever, body aches or throat pain  May take longer acting antihistamine for seasonal allergy symptoms prn  May use saline nasal spray for nasal congestion  May use Flonase or Nasocort for allergen nasal congestion  May gargle with salt water prn for throat discomfort  May drink smoothies for nutrition if too painful to swallow solid foods  Monitor for productive cough, SOB and chest pain/tightness- need re-evaluation

## 2017-09-01 ENCOUNTER — HOSPITAL ENCOUNTER (OUTPATIENT)
Dept: RADIOLOGY | Facility: MEDICAL CENTER | Age: 42
End: 2017-09-01
Attending: NURSE PRACTITIONER
Payer: COMMERCIAL

## 2017-09-01 DIAGNOSIS — M54.5 LOW BACK PAIN, UNSPECIFIED BACK PAIN LATERALITY, UNSPECIFIED CHRONICITY, WITH SCIATICA PRESENCE UNSPECIFIED: ICD-10-CM

## 2017-09-01 DIAGNOSIS — R53.83 FATIGUE, UNSPECIFIED TYPE: ICD-10-CM

## 2017-09-01 DIAGNOSIS — E78.2 MIXED HYPERLIPIDEMIA: ICD-10-CM

## 2017-09-01 DIAGNOSIS — R76.8 FALSE POSITIVE SEROLOGICAL TEST FOR SYPHILIS: ICD-10-CM

## 2017-09-01 DIAGNOSIS — E55.9 UNSPECIFIED VITAMIN D DEFICIENCY: ICD-10-CM

## 2017-09-01 DIAGNOSIS — R56.9 CONVULSIONS, UNSPECIFIED CONVULSION TYPE (HCC): ICD-10-CM

## 2017-09-01 DIAGNOSIS — J18.9 PNEUMONIA, ORGANISM UNSPECIFIED(486): ICD-10-CM

## 2017-09-01 DIAGNOSIS — Z13.220 SCREENING FOR LIPOID DISORDERS: ICD-10-CM

## 2017-09-01 DIAGNOSIS — Z79.899 ENCOUNTER FOR LONG-TERM (CURRENT) USE OF OTHER MEDICATIONS: ICD-10-CM

## 2017-09-01 DIAGNOSIS — J45.909 INTRINSIC ASTHMA WITHOUT STATUS ASTHMATICUS, UNSPECIFIED ASTHMA SEVERITY, UNCOMPLICATED: ICD-10-CM

## 2017-09-01 DIAGNOSIS — R05.9 COUGH: ICD-10-CM

## 2017-09-01 PROCEDURE — 71020 DX-CHEST-2 VIEWS: CPT

## 2017-11-02 ENCOUNTER — HOSPITAL ENCOUNTER (OUTPATIENT)
Dept: LAB | Facility: MEDICAL CENTER | Age: 42
End: 2017-11-02
Attending: NURSE PRACTITIONER
Payer: COMMERCIAL

## 2017-11-02 LAB
ALBUMIN SERPL BCP-MCNC: 4.5 G/DL (ref 3.2–4.9)
ALBUMIN/GLOB SERPL: 1.7 G/DL
ALP SERPL-CCNC: 50 U/L (ref 30–99)
ALT SERPL-CCNC: 15 U/L (ref 2–50)
ANION GAP SERPL CALC-SCNC: 5 MMOL/L (ref 0–11.9)
AST SERPL-CCNC: 18 U/L (ref 12–45)
BILIRUB SERPL-MCNC: 0.7 MG/DL (ref 0.1–1.5)
BUN SERPL-MCNC: 12 MG/DL (ref 8–22)
CALCIUM SERPL-MCNC: 9.7 MG/DL (ref 8.5–10.5)
CHLORIDE SERPL-SCNC: 105 MMOL/L (ref 96–112)
CO2 SERPL-SCNC: 26 MMOL/L (ref 20–33)
CREAT SERPL-MCNC: 0.76 MG/DL (ref 0.5–1.4)
GFR SERPL CREATININE-BSD FRML MDRD: >60 ML/MIN/1.73 M 2
GLOBULIN SER CALC-MCNC: 2.6 G/DL (ref 1.9–3.5)
GLUCOSE SERPL-MCNC: 96 MG/DL (ref 65–99)
POTASSIUM SERPL-SCNC: 4.3 MMOL/L (ref 3.6–5.5)
PROT SERPL-MCNC: 7.1 G/DL (ref 6–8.2)
SODIUM SERPL-SCNC: 136 MMOL/L (ref 135–145)

## 2017-11-02 PROCEDURE — 82784 ASSAY IGA/IGD/IGG/IGM EACH: CPT

## 2017-11-02 PROCEDURE — 36415 COLL VENOUS BLD VENIPUNCTURE: CPT

## 2017-11-02 PROCEDURE — 83704 LIPOPROTEIN BLD QUAN PART: CPT

## 2017-11-02 PROCEDURE — 82785 ASSAY OF IGE: CPT

## 2017-11-02 PROCEDURE — 80061 LIPID PANEL: CPT

## 2017-11-02 PROCEDURE — 80053 COMPREHEN METABOLIC PANEL: CPT

## 2017-11-04 LAB
IGA SERPL-MCNC: 185 MG/DL (ref 68–408)
IGG SERPL-MCNC: 590 MG/DL (ref 768–1632)
IGM SERPL-MCNC: 42 MG/DL (ref 35–263)

## 2017-11-05 LAB — IGE SERPL-ACNC: 70 KU/L

## 2017-11-07 LAB
CHOLEST SERPL-MCNC: 211 MG/DL
HDL PARTICAL NO Q4363: 36.1 UMOL/L
HDL SIZE Q4361: 8.7 NM
HDLC SERPL-MCNC: 58 MG/DL (ref 40–59)
HLD.LARGE SERPL-SCNC: 4.2 UMOL/L
L VLDL PART NO Q4357: 2.8 NMOL/L
LDL SERPL QN: 21 NM
LDL SERPL-SCNC: 1531 NMOL/L
LDL SMALL SERPL-SCNC: 602 NMOL/L
LDLC SERPL CALC-MCNC: 138 MG/DL
PATHOLOGY STUDY: ABNORMAL
TRIGL SERPL-MCNC: 77 MG/DL (ref 30–149)
VLDL SIZE Q4362: 47.6 NM

## 2018-03-23 ENCOUNTER — OFFICE VISIT (OUTPATIENT)
Dept: NEUROLOGY | Facility: MEDICAL CENTER | Age: 43
End: 2018-03-23
Payer: COMMERCIAL

## 2018-03-23 VITALS
OXYGEN SATURATION: 94 % | TEMPERATURE: 98 F | SYSTOLIC BLOOD PRESSURE: 100 MMHG | HEIGHT: 69 IN | WEIGHT: 188.16 LBS | BODY MASS INDEX: 27.87 KG/M2 | RESPIRATION RATE: 16 BRPM | DIASTOLIC BLOOD PRESSURE: 62 MMHG | HEART RATE: 75 BPM

## 2018-03-23 DIAGNOSIS — G40.909 SEIZURE CEREBRAL (HCC): ICD-10-CM

## 2018-03-23 PROCEDURE — 99213 OFFICE O/P EST LOW 20 MIN: CPT | Performed by: PSYCHIATRY & NEUROLOGY

## 2018-03-23 RX ORDER — OXCARBAZEPINE 300 MG/1
300 TABLET, FILM COATED ORAL 2 TIMES DAILY
Qty: 180 TAB | Refills: 3 | Status: SHIPPED | OUTPATIENT
Start: 2018-03-23 | End: 2018-05-31 | Stop reason: SDUPTHER

## 2018-03-23 NOTE — PROGRESS NOTES
NEUROLOGY NOTE    Referring Physician  Dr Man    CHIEF COMPLAINT:    No more spells with trileptal    Had some trouble of memory problems for years    One episode of falling and shaking-- 2005 and Nov 2015-- lasting for a couple of seconds   Since last visit, he developed one spell-- all limbs were tingling and urine intolerance  Chief Complaint   Patient presents with   • Follow-Up     Seizure cerebral       PRESENT ILLNESS:       No more spells with trileptal  Had some trouble of memory problems for years    He did not enjoy the zonegran well-- zonegran 200mg HS made him not able to think clearly    He developed one spell-- all limbs were tingling and urine intolerance-- he denied passing out    The last scalp EEG and MRI were not remarkable    One episode of falling and shaking-- 2005 and Nov 2015-- lasting for a couple of seconds   Hx of brain concussion---6 times --the worse one, he passed out for 2 days  He thought that could be not associated with loss of consciciouness    He was put on keppra--he took it once and did not feel right and stopped by himself    PAST MEDICAL HISTORY:  No past medical history on file.    PAST SURGICAL HISTORY:  No past surgical history on file.    FAMILY HISTORY:  No family history on file.    SOCIAL HISTORY:  Social History     Social History   • Marital status:      Spouse name: N/A   • Number of children: N/A   • Years of education: N/A     Occupational History   • Not on file.     Social History Main Topics   • Smoking status: Never Smoker   • Smokeless tobacco: Current User     Types: Chew   • Alcohol use No   • Drug use: No   • Sexual activity: Yes     Other Topics Concern   • Not on file     Social History Narrative   • No narrative on file     ALLERGIES:  No Known Allergies  TOBHX  History   Smoking Status   • Never Smoker   Smokeless Tobacco   • Current User   • Types: Chew     ALCHX  History   Alcohol Use No     DRUGHX  History   Drug Use No  "          MEDICATIONS:  Current Outpatient Prescriptions   Medication   • cetirizine (ZYRTEC) 10 MG Tab   • oxcarbazepine (TRILEPTAL) 300 MG Tab   • sertraline (ZOLOFT) 100 MG Tab   • MethylPREDNISolone (MEDROL DOSEPAK) 4 MG Tablet Therapy Pack   • albuterol 108 (90 Base) MCG/ACT Aero Soln inhalation aerosol   • Cetirizine HCl 10 MG Cap   • NS SOLN 60 mL with albuterol 2.5 mg/0.5 mL NEBU 5 mL   • ALBUTEROL INH     No current facility-administered medications for this visit.        REVIEW OF SYSTEM:    Constitutional: Denies fevers, Denies weight changes   Eyes: Denies changes in vision, no eye pain   Ears/Nose/Throat/Mouth: Denies nasal congestion or sore throat   Cardiovascular: Denies chest pain or palpitations   Respiratory: Denies SOB.   Gastrointestinal/Hepatic: Denies abdominal pain, nausea, vomiting, diarrhea, constipation or GI bleeding   Genitourinary: Denies bladder dysfunction, dysuria or frequency   Musculoskeletal/Rheum: Denies joint pain and swelling   Skin/Breast: Denies rash, denies breast lumps or discharge   Neurological: 2 spells of shaking and falling , head injury before  Psychiatric: denies mood disorder   Endocrine: denies hx of diabetes or thyroid dysfunction   Heme/Oncology/Lymph Nodes: Denies enlarged lymph nodes, denies brusing or known bleeding disorder   Allergic/Immunologic: Denies hx of allergies         PHYSICAL AND NEUROLOGICAL EXMAINATIONS:  VITAL SIGNS: /62   Pulse 75   Temp 36.7 °C (98 °F)   Resp 16   Ht 1.753 m (5' 9\")   Wt 85.3 kg (188 lb 2.6 oz)   SpO2 94%   BMI 27.79 kg/m²   CURRENT WEIGHT:   BMI: Body mass index is 27.79 kg/m².  PREVIOUS WEIGHTS:  Wt Readings from Last 25 Encounters:   03/23/18 85.3 kg (188 lb 2.6 oz)   08/28/17 79.4 kg (175 lb)   08/24/17 79.5 kg (175 lb 3.2 oz)   04/17/17 80.7 kg (177 lb 12.8 oz)   01/31/17 80.7 kg (178 lb)   11/08/16 80.6 kg (177 lb 11.1 oz)   07/07/16 80.3 kg (177 lb)   03/04/16 78.5 kg (173 lb)   01/27/16 75.8 kg (167 lb) "       General appearance of patient: WDWN(+) NAD(+)    EYES  o Fundus : Papilledem(-) Exudates(-) Hemorrhage(-)  Nervous System  Orientation to time, place and person(+)  Memory normal(-) Recall 1/3  Language: aphasia(-)  Knowledge: past(+) Current(+)  Attention(+)  Cranial Nerves  • Nerve 2: intact  • Nerve 3,4,6: intact  • Nerve 5 : intact  • Nerve 7: intact  • Nerve 8: intact  • Nerve 9 & 10: intact  • Nerve 11: intact  • Nerve 12: intact  Muscle Power and muscle tone: symmetric, normal in upper and lower  Sensory System: Pin sensation intact(+)  Reflexes: symmetric throughout  Cerebellar Function FNP normal   Gait : Steady(+) TandemGait steady(+)  Heart and Vascular  Peripheral Vasucular system : Edema (-) Swelling(-)  RHB, Breathing sound clear  abdomen bowel sound normoactive  Extremities freely moveable  Joints no contracture     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 Two spells of shaking and falls-- the first one was 2005, the last one was Nov 2015-- patient denied passing out  2.  Hx of Brain contusion  3.  One episode of urine incontinence followed by tingling of limbs yesterday--not lately  4.  Memory deterioration for years-- spells of day dreaming but not passing out-- not lately    PLAN/RECOMMENDATIONS:      Denied depression    Denied any spells since last visit    Continue the Trileptal  300mg two times per day  Denied spells of passing out or starring    We also discuss about the benefits and risks about taking Trileptal vs not taking Trileptal  The spells of shaking did not happen since Trileptal was on board  Without Trileptal, the spells might come back though           Will see Childs in 12 months  If the memory continue to be a problem, we could offer repeat MRI of brain or even 5 days inpatient video EEG in the future if necessary         INTERPRETATION:        INTERPRETATION:  This  routine scalp EEG is still within normal limit.    Of note, unremarkable EEG does not completely exclude the diagnosis  of seizures since seizure is an episodic phenomena.  Clinical correlation may help      If clinical suspicion of seizure remains high.  Prolonged outpatient EEG    monitoring may be of help.           ____________________________________     MD RICK WILSON / BRIAN    DD:  04/03/2017 00:20:42  DT:  04/03/2017 01:36:40      MRI of brain 2/4/2016-- not remarkable

## 2018-03-26 ENCOUNTER — HOSPITAL ENCOUNTER (OUTPATIENT)
Dept: LAB | Facility: MEDICAL CENTER | Age: 43
End: 2018-03-26
Attending: NURSE PRACTITIONER
Payer: COMMERCIAL

## 2018-03-26 LAB
25(OH)D3 SERPL-MCNC: 22 NG/ML (ref 30–100)
ALBUMIN SERPL BCP-MCNC: 4.5 G/DL (ref 3.2–4.9)
ALBUMIN/GLOB SERPL: 1.9 G/DL
ALP SERPL-CCNC: 40 U/L (ref 30–99)
ALT SERPL-CCNC: 18 U/L (ref 2–50)
ANION GAP SERPL CALC-SCNC: 7 MMOL/L (ref 0–11.9)
AST SERPL-CCNC: 17 U/L (ref 12–45)
BILIRUB CONJ SERPL-MCNC: <0.1 MG/DL (ref 0.1–0.5)
BILIRUB INDIRECT SERPL-MCNC: NORMAL MG/DL (ref 0–1)
BILIRUB SERPL-MCNC: 0.4 MG/DL (ref 0.1–1.5)
BUN SERPL-MCNC: 13 MG/DL (ref 8–22)
CALCIUM SERPL-MCNC: 9.4 MG/DL (ref 8.5–10.5)
CHLORIDE SERPL-SCNC: 105 MMOL/L (ref 96–112)
CHOLEST SERPL-MCNC: 189 MG/DL (ref 100–199)
CO2 SERPL-SCNC: 28 MMOL/L (ref 20–33)
CREAT SERPL-MCNC: 0.93 MG/DL (ref 0.5–1.4)
GLOBULIN SER CALC-MCNC: 2.4 G/DL (ref 1.9–3.5)
GLUCOSE SERPL-MCNC: 106 MG/DL (ref 65–99)
HDLC SERPL-MCNC: 48 MG/DL
LDLC SERPL CALC-MCNC: 111 MG/DL
POTASSIUM SERPL-SCNC: 4.4 MMOL/L (ref 3.6–5.5)
PROT SERPL-MCNC: 6.9 G/DL (ref 6–8.2)
SODIUM SERPL-SCNC: 140 MMOL/L (ref 135–145)
TRIGL SERPL-MCNC: 148 MG/DL (ref 0–149)

## 2018-03-26 PROCEDURE — 80053 COMPREHEN METABOLIC PANEL: CPT

## 2018-03-26 PROCEDURE — 82248 BILIRUBIN DIRECT: CPT

## 2018-03-26 PROCEDURE — 82306 VITAMIN D 25 HYDROXY: CPT

## 2018-03-26 PROCEDURE — 80061 LIPID PANEL: CPT

## 2018-03-26 PROCEDURE — 36415 COLL VENOUS BLD VENIPUNCTURE: CPT

## 2018-03-29 ENCOUNTER — OFFICE VISIT (OUTPATIENT)
Dept: NEUROLOGY | Facility: MEDICAL CENTER | Age: 43
End: 2018-03-29
Payer: COMMERCIAL

## 2018-03-29 VITALS
TEMPERATURE: 98 F | HEIGHT: 69 IN | SYSTOLIC BLOOD PRESSURE: 116 MMHG | OXYGEN SATURATION: 93 % | HEART RATE: 77 BPM | DIASTOLIC BLOOD PRESSURE: 64 MMHG | BODY MASS INDEX: 27.85 KG/M2 | RESPIRATION RATE: 16 BRPM | WEIGHT: 188 LBS

## 2018-03-29 DIAGNOSIS — R41.89 SPELL OF ALTERED COGNITION: ICD-10-CM

## 2018-03-29 PROCEDURE — 99212 OFFICE O/P EST SF 10 MIN: CPT | Performed by: NURSE PRACTITIONER

## 2018-03-29 NOTE — PROGRESS NOTES
Subjective:      Amadeo Fernandez is a 42 y.o. male who presents with Follow-Up (DMV paperwork)            HPI  Last seen per Dr Cheng just one week ago.  He now needs his DMV form completed.    Per Dr Cheng:    No more spells with trileptal     Had some trouble of memory problems for years     One episode of falling and shaking-- 2005 and Nov 2015-- lasting for a couple of seconds   Since last visit, he developed one spell-- all limbs were tingling and urine intolerance    4/2017:   INTERPRETATION:  This routine scalp EEG is still within normal limit.     Of note, unremarkable EEG does not completely exclude the diagnosis of seizures since seizure is an episodic phenomena.  Clinical correlation may help    If clinical suspicion of seizure remains high.  Prolonged outpatient EEG monitoring may be of help.    Current Outpatient Prescriptions   Medication Sig Dispense Refill   • OXcarbazepine (TRILEPTAL) 300 MG Tab Take 1 Tab by mouth 2 times a day. 180 Tab 3   • albuterol 108 (90 Base) MCG/ACT Aero Soln inhalation aerosol Inhale 2 Puffs by mouth every 6 hours as needed for Shortness of Breath. 8.5 g 0   • cetirizine (ZYRTEC) 10 MG Tab   3   • sertraline (ZOLOFT) 100 MG Tab Take 150 mg by mouth every day.       No current facility-administered medications for this visit.        Review of Systems   Constitutional: Negative.    HENT: Negative for hearing loss, nosebleeds and sore throat.         No recent head injury.   Eyes: Negative for double vision.        No new loss of vision.   Respiratory: Negative for cough.         No recent lung infections.   Cardiovascular: Negative for chest pain.   Gastrointestinal: Negative for abdominal pain, diarrhea, nausea and vomiting.   Genitourinary: Negative.    Musculoskeletal: Negative.    Skin: Negative.    Neurological: Negative for seizures and headaches.   Endo/Heme/Allergies:        No history of endocrine dysfunction.  No new problems.   Psychiatric/Behavioral: Negative  "for depression. The patient is not nervous/anxious.         No recent mood changes.          Objective:     /64   Pulse 77   Temp 36.7 °C (98 °F)   Resp 16   Ht 1.753 m (5' 9\")   Wt 85.3 kg (188 lb)   SpO2 93%   BMI 27.76 kg/m²      Physical Exam   Constitutional: He is oriented to person, place, and time. He appears well-developed and well-nourished. No distress.   HENT:   Head: Normocephalic and atraumatic.   Nose: Nose normal.   No new hearing loss.   Eyes: EOM are normal.   No double vision or loss of vision.   Neck: Normal range of motion.   Cardiovascular: Normal rate, regular rhythm and normal heart sounds.    No recent chest pain.   Pulmonary/Chest: Effort normal.   Abdominal: There is no tenderness.   Genitourinary:   Genitourinary Comments: No recent infections.   Musculoskeletal: Normal range of motion.   Lymphadenopathy:     He has no cervical adenopathy.   Neurological: He is alert and oriented to person, place, and time. He has normal strength and normal reflexes. He displays no tremor. No cranial nerve deficit. He displays no seizure activity. Gait normal.   No observable changes in neurologic status.  See initial new patient examination for details.     Skin: Skin is warm and dry.   Psychiatric: He has a normal mood and affect. His speech is normal. His mood appears not anxious. He does not exhibit a depressed mood.               Assessment/Plan:     Seizure disorder:  Seizure free since November 2015.  Continue OXC 300mg BID.    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  DMV form completed.    Return for followup in one year.  Will anticipate a DMV form at that time.          "

## 2018-04-03 ASSESSMENT — ENCOUNTER SYMPTOMS
NERVOUS/ANXIOUS: 0
SEIZURES: 0
VOMITING: 0
HEADACHES: 0
DEPRESSION: 0
SORE THROAT: 0
DOUBLE VISION: 0
ABDOMINAL PAIN: 0
MUSCULOSKELETAL NEGATIVE: 1
DIARRHEA: 0
COUGH: 0
CONSTITUTIONAL NEGATIVE: 1
NAUSEA: 0

## 2018-05-31 DIAGNOSIS — G40.909 SEIZURE CEREBRAL (HCC): ICD-10-CM

## 2018-06-01 RX ORDER — OXCARBAZEPINE 300 MG/1
TABLET, FILM COATED ORAL
Qty: 189 TAB | Refills: 1 | Status: SHIPPED | OUTPATIENT
Start: 2018-06-01 | End: 2018-12-06 | Stop reason: SDUPTHER

## 2018-06-19 ENCOUNTER — HOSPITAL ENCOUNTER (OUTPATIENT)
Dept: LAB | Facility: MEDICAL CENTER | Age: 43
End: 2018-06-19
Attending: INTERNAL MEDICINE
Payer: COMMERCIAL

## 2018-06-19 LAB
GRAM STN SPEC: NORMAL
SIGNIFICANT IND 70042: NORMAL
SITE SITE: NORMAL
SOURCE SOURCE: NORMAL

## 2018-06-19 PROCEDURE — 87102 FUNGUS ISOLATION CULTURE: CPT

## 2018-06-19 PROCEDURE — 87070 CULTURE OTHR SPECIMN AEROBIC: CPT

## 2018-06-19 PROCEDURE — 87205 SMEAR GRAM STAIN: CPT

## 2018-06-21 LAB
BACTERIA SPEC RESP CULT: ABNORMAL
GRAM STN SPEC: ABNORMAL
SIGNIFICANT IND 70042: ABNORMAL
SITE SITE: ABNORMAL
SOURCE SOURCE: ABNORMAL

## 2018-06-22 ENCOUNTER — HOSPITAL ENCOUNTER (OUTPATIENT)
Dept: RADIOLOGY | Facility: MEDICAL CENTER | Age: 43
End: 2018-06-22
Attending: INTERNAL MEDICINE
Payer: COMMERCIAL

## 2018-06-22 DIAGNOSIS — J32.9 CHRONIC SINUSITIS, UNSPECIFIED LOCATION: ICD-10-CM

## 2018-06-22 PROCEDURE — 70486 CT MAXILLOFACIAL W/O DYE: CPT

## 2018-07-18 LAB
FUNGUS SPEC CULT: NORMAL
SIGNIFICANT IND 70042: NORMAL
SITE SITE: NORMAL
SOURCE SOURCE: NORMAL

## 2018-08-16 ENCOUNTER — APPOINTMENT (RX ONLY)
Dept: URBAN - METROPOLITAN AREA CLINIC 4 | Facility: CLINIC | Age: 43
Setting detail: DERMATOLOGY
End: 2018-08-16

## 2018-08-16 DIAGNOSIS — L82.1 OTHER SEBORRHEIC KERATOSIS: ICD-10-CM

## 2018-08-16 DIAGNOSIS — D18.0 HEMANGIOMA: ICD-10-CM

## 2018-08-16 DIAGNOSIS — Z71.89 OTHER SPECIFIED COUNSELING: ICD-10-CM

## 2018-08-16 DIAGNOSIS — L81.4 OTHER MELANIN HYPERPIGMENTATION: ICD-10-CM

## 2018-08-16 DIAGNOSIS — L73.8 OTHER SPECIFIED FOLLICULAR DISORDERS: ICD-10-CM

## 2018-08-16 DIAGNOSIS — D22 MELANOCYTIC NEVI: ICD-10-CM

## 2018-08-16 DIAGNOSIS — L57.8 OTHER SKIN CHANGES DUE TO CHRONIC EXPOSURE TO NONIONIZING RADIATION: ICD-10-CM

## 2018-08-16 PROBLEM — D22.5 MELANOCYTIC NEVI OF TRUNK: Status: ACTIVE | Noted: 2018-08-16

## 2018-08-16 PROBLEM — D22.62 MELANOCYTIC NEVI OF LEFT UPPER LIMB, INCLUDING SHOULDER: Status: ACTIVE | Noted: 2018-08-16

## 2018-08-16 PROBLEM — D48.5 NEOPLASM OF UNCERTAIN BEHAVIOR OF SKIN: Status: ACTIVE | Noted: 2018-08-16

## 2018-08-16 PROBLEM — D22.61 MELANOCYTIC NEVI OF RIGHT UPPER LIMB, INCLUDING SHOULDER: Status: ACTIVE | Noted: 2018-08-16

## 2018-08-16 PROBLEM — D18.01 HEMANGIOMA OF SKIN AND SUBCUTANEOUS TISSUE: Status: ACTIVE | Noted: 2018-08-16

## 2018-08-16 PROBLEM — D22.4 MELANOCYTIC NEVI OF SCALP AND NECK: Status: ACTIVE | Noted: 2018-08-16

## 2018-08-16 PROCEDURE — ? BIOPSY BY SHAVE METHOD

## 2018-08-16 PROCEDURE — ? ADDITIONAL NOTES

## 2018-08-16 PROCEDURE — 11100: CPT

## 2018-08-16 PROCEDURE — 99214 OFFICE O/P EST MOD 30 MIN: CPT | Mod: 25

## 2018-08-16 PROCEDURE — ? COUNSELING

## 2018-08-16 ASSESSMENT — LOCATION DETAILED DESCRIPTION DERM
LOCATION DETAILED: EPIGASTRIC SKIN
LOCATION DETAILED: LEFT ANTERIOR DISTAL UPPER ARM
LOCATION DETAILED: LEFT MEDIAL UPPER BACK
LOCATION DETAILED: RIGHT ANTERIOR PROXIMAL UPPER ARM
LOCATION DETAILED: INFERIOR THORACIC SPINE
LOCATION DETAILED: LEFT INFERIOR MEDIAL FOREHEAD
LOCATION DETAILED: MIDDLE STERNUM
LOCATION DETAILED: MID-FRONTAL SCALP
LOCATION DETAILED: RIGHT ANTERIOR DISTAL UPPER ARM
LOCATION DETAILED: STERNAL NOTCH
LOCATION DETAILED: LEFT ANTERIOR PROXIMAL UPPER ARM
LOCATION DETAILED: SUPERIOR THORACIC SPINE
LOCATION DETAILED: LEFT SUPERIOR MEDIAL UPPER BACK
LOCATION DETAILED: MID POSTERIOR NECK
LOCATION DETAILED: LOWER STERNUM

## 2018-08-16 ASSESSMENT — LOCATION SIMPLE DESCRIPTION DERM
LOCATION SIMPLE: ABDOMEN
LOCATION SIMPLE: POSTERIOR NECK
LOCATION SIMPLE: LEFT UPPER ARM
LOCATION SIMPLE: UPPER BACK
LOCATION SIMPLE: CHEST
LOCATION SIMPLE: ANTERIOR SCALP
LOCATION SIMPLE: LEFT UPPER BACK
LOCATION SIMPLE: LEFT FOREHEAD
LOCATION SIMPLE: RIGHT UPPER ARM

## 2018-08-16 ASSESSMENT — LOCATION ZONE DERM
LOCATION ZONE: TRUNK
LOCATION ZONE: ARM
LOCATION ZONE: SCALP
LOCATION ZONE: FACE
LOCATION ZONE: NECK

## 2018-08-16 NOTE — HPI: EVALUATION OF SKIN LESION(S)
How Severe Are Your Spot(S)?: mild
Have Your Spot(S) Been Treated In The Past?: has not been treated
Hpi Title: Evaluation of Skin Lesions
Additional History: Patient would like FBE today. Recheck monitored nevi on back.

## 2018-08-16 NOTE — PROCEDURE: BIOPSY BY SHAVE METHOD
Hemostasis: Drysol
Lab Facility: [id number]
Anesthesia Type: 1% lidocaine with 1:100,000 epinephrine and 408mcg clindamycin/ml and a 1:10 solution of 8.4% sodium bicarbonate
Biopsy Type: H and E
Curettage Text: The wound bed was treated with curettage after the biopsy was performed.
Wound Care: Petrolatum
Was A Bandage Applied: Yes
X Size Of Lesion In Cm: 0
Billing Type: Third-Party Bill
Silver Nitrate Text: The wound bed was treated with silver nitrate after the biopsy was performed.
Bill For Surgical Tray: no
Electrodesiccation And Curettage Text: The wound bed was treated with electrodesiccation and curettage after the biopsy was performed.
Lab: [id number]
Post-Care Instructions: I reviewed with the patient in detail post-care instructions. Patient is to keep the biopsy site dry overnight, and then apply bacitracin twice daily until healed. Patient may apply hydrogen peroxide soaks to remove any crusting.
Notification Instructions: Patient will be notified of biopsy results. However, patient instructed to call the office if not contacted within 2 weeks.
Electrodesiccation Text: The wound bed was treated with electrodesiccation after the biopsy was performed.
Cryotherapy Text: The wound bed was treated with cryotherapy after the biopsy was performed.
Biopsy Method: Dermablade
Type Of Destruction Used: Curettage
Consent: Written consent was obtained and risks were reviewed including but not limited to scarring, infection, bleeding, scabbing, incomplete removal, nerve damage and allergy to anesthesia.
Size Of Lesion In Cm: 0.5
Depth Of Biopsy: dermis
Detail Level: Detailed
Dressing: bandage

## 2018-09-06 ENCOUNTER — HOSPITAL ENCOUNTER (OUTPATIENT)
Dept: LAB | Facility: MEDICAL CENTER | Age: 43
End: 2018-09-06
Attending: NURSE PRACTITIONER
Payer: COMMERCIAL

## 2018-09-06 LAB
25(OH)D3 SERPL-MCNC: 32 NG/ML (ref 30–100)
ALBUMIN SERPL BCP-MCNC: 4.6 G/DL (ref 3.2–4.9)
ALBUMIN/GLOB SERPL: 2 G/DL
ALP SERPL-CCNC: 47 U/L (ref 30–99)
ALT SERPL-CCNC: 20 U/L (ref 2–50)
ANION GAP SERPL CALC-SCNC: 6 MMOL/L (ref 0–11.9)
AST SERPL-CCNC: 17 U/L (ref 12–45)
BILIRUB SERPL-MCNC: 0.8 MG/DL (ref 0.1–1.5)
BUN SERPL-MCNC: 16 MG/DL (ref 8–22)
CALCIUM SERPL-MCNC: 9.2 MG/DL (ref 8.5–10.5)
CHLORIDE SERPL-SCNC: 106 MMOL/L (ref 96–112)
CHOLEST SERPL-MCNC: 171 MG/DL (ref 100–199)
CO2 SERPL-SCNC: 27 MMOL/L (ref 20–33)
CREAT SERPL-MCNC: 0.84 MG/DL (ref 0.5–1.4)
GLOBULIN SER CALC-MCNC: 2.3 G/DL (ref 1.9–3.5)
GLUCOSE SERPL-MCNC: 111 MG/DL (ref 65–99)
HDLC SERPL-MCNC: 60 MG/DL
LDLC SERPL CALC-MCNC: 99 MG/DL
POTASSIUM SERPL-SCNC: 4.1 MMOL/L (ref 3.6–5.5)
PROT SERPL-MCNC: 6.9 G/DL (ref 6–8.2)
SODIUM SERPL-SCNC: 139 MMOL/L (ref 135–145)
TRIGL SERPL-MCNC: 62 MG/DL (ref 0–149)

## 2018-09-06 PROCEDURE — 36415 COLL VENOUS BLD VENIPUNCTURE: CPT

## 2018-09-06 PROCEDURE — 82306 VITAMIN D 25 HYDROXY: CPT

## 2018-09-06 PROCEDURE — 82784 ASSAY IGA/IGD/IGG/IGM EACH: CPT

## 2018-09-06 PROCEDURE — 82785 ASSAY OF IGE: CPT

## 2018-09-06 PROCEDURE — 80061 LIPID PANEL: CPT

## 2018-09-06 PROCEDURE — 80053 COMPREHEN METABOLIC PANEL: CPT

## 2018-09-07 LAB
IGA SERPL-MCNC: 164 MG/DL (ref 68–408)
IGE SERPL-ACNC: 77 KU/L
IGG SERPL-MCNC: 516 MG/DL (ref 768–1632)
IGM SERPL-MCNC: 36 MG/DL (ref 35–263)

## 2018-12-06 DIAGNOSIS — G40.909 SEIZURE CEREBRAL (HCC): ICD-10-CM

## 2018-12-06 RX ORDER — OXCARBAZEPINE 300 MG/1
TABLET, FILM COATED ORAL
Qty: 180 TAB | Refills: 1 | Status: SHIPPED | OUTPATIENT
Start: 2018-12-06 | End: 2019-04-24 | Stop reason: SDUPTHER

## 2018-12-07 ENCOUNTER — HOSPITAL ENCOUNTER (OUTPATIENT)
Dept: LAB | Facility: MEDICAL CENTER | Age: 43
End: 2018-12-07
Attending: NURSE PRACTITIONER
Payer: COMMERCIAL

## 2018-12-07 PROCEDURE — 87070 CULTURE OTHR SPECIMN AEROBIC: CPT

## 2018-12-07 PROCEDURE — 87102 FUNGUS ISOLATION CULTURE: CPT

## 2018-12-10 ENCOUNTER — HOSPITAL ENCOUNTER (OUTPATIENT)
Dept: RADIOLOGY | Facility: MEDICAL CENTER | Age: 43
End: 2018-12-10
Attending: NURSE PRACTITIONER
Payer: COMMERCIAL

## 2018-12-10 DIAGNOSIS — J32.9 CHRONIC SINUSITIS, UNSPECIFIED LOCATION: ICD-10-CM

## 2018-12-10 PROCEDURE — 70486 CT MAXILLOFACIAL W/O DYE: CPT

## 2019-01-21 ENCOUNTER — OFFICE VISIT (OUTPATIENT)
Dept: NEUROLOGY | Facility: MEDICAL CENTER | Age: 44
End: 2019-01-21
Payer: COMMERCIAL

## 2019-01-21 VITALS
TEMPERATURE: 97.7 F | SYSTOLIC BLOOD PRESSURE: 124 MMHG | BODY MASS INDEX: 26.66 KG/M2 | HEART RATE: 79 BPM | HEIGHT: 69 IN | OXYGEN SATURATION: 97 % | DIASTOLIC BLOOD PRESSURE: 66 MMHG | WEIGHT: 180 LBS

## 2019-01-21 DIAGNOSIS — R41.89 SPELL OF ALTERED COGNITION: ICD-10-CM

## 2019-01-21 PROCEDURE — 99213 OFFICE O/P EST LOW 20 MIN: CPT | Performed by: NURSE PRACTITIONER

## 2019-01-21 RX ORDER — BUSPIRONE HYDROCHLORIDE 7.5 MG/1
7.5 TABLET ORAL 2 TIMES DAILY
COMMUNITY
End: 2019-03-27

## 2019-01-21 ASSESSMENT — PATIENT HEALTH QUESTIONNAIRE - PHQ9: CLINICAL INTERPRETATION OF PHQ2 SCORE: 0

## 2019-01-21 NOTE — PROGRESS NOTES
"Subjective:      Amadeo Fernandez is a 43 y.o. male who presents with Follow-Up (Seizures)          HPI Reports that he is taking buspar now which is very effective fore him.  He also reports that he is taking Oxcarbazepine 300mg qhs only.  Has the goal of tapering off the OXC.    One episode of falling and shaking-- 2005 and Nov 2015-- lasting for a couple of seconds.  Also had a spell in which all limbs were tingling and urine intolerance.    He needs DMV form completed today.     Started on buspar about 3 weeks ago and his anger has greatly improved and his sleep has improved.        Current Outpatient Prescriptions   Medication Sig Dispense Refill   • busPIRone (BUSPAR) 7.5 MG tablet Take 7.5 mg by mouth 2 times a day.     • OXcarbazepine (TRILEPTAL) 300 MG Tab Take one tablet by mouth twice daily (Patient taking differently: one tab once a day) 180 Tab 1   • cetirizine (ZYRTEC) 10 MG Tab   3     No current facility-administered medications for this visit.          Review of Systems   Constitutional: Negative.    HENT: Negative for hearing loss, nosebleeds and sore throat.         No recent head injury.   Eyes: Negative for double vision.        No new loss of vision.   Respiratory: Negative for cough.         No recent lung infections.   Cardiovascular: Negative for chest pain.   Gastrointestinal: Negative for abdominal pain, diarrhea, nausea and vomiting.   Genitourinary: Negative.    Musculoskeletal: Negative.    Skin: Negative.    Neurological: Negative for seizures and headaches.   Endo/Heme/Allergies:        No history of endocrine dysfunction.  No new problems.   Psychiatric/Behavioral: Negative for depression. The patient is nervous/anxious.         No recent mood changes.          Objective:     /66   Pulse 79   Temp 36.5 °C (97.7 °F) (Temporal)   Ht 1.753 m (5' 9\")   Wt 81.6 kg (180 lb)   SpO2 97%   BMI 26.58 kg/m²      Physical Exam   Constitutional: He is oriented to person, place, and " time. He appears well-developed and well-nourished. No distress.   HENT:   Head: Normocephalic and atraumatic.   Nose: Nose normal.   No new hearing loss.   Eyes: EOM are normal.   No double vision or loss of vision.   Neck: Normal range of motion.   Cardiovascular: Normal rate and regular rhythm.    No recent chest pain.   Pulmonary/Chest: Effort normal.   Abdominal: Soft. There is no tenderness.   Genitourinary:   Genitourinary Comments: No recent infections.   Musculoskeletal: Normal range of motion.   Lymphadenopathy:     He has no cervical adenopathy.   Neurological: He is alert and oriented to person, place, and time. He has normal strength and normal reflexes. He displays no tremor. No cranial nerve deficit. He displays no seizure activity. Gait normal.   No observable changes in neurologic status.  See initial new patient examination for details.     Skin: Skin is warm and dry.   Psychiatric: He has a normal mood and affect. His speech is normal. His mood appears not anxious. He does not exhibit a depressed mood.               Assessment/Plan:     Seizure disorder:  Seizure free since November 2015.  Continue OXC 300mg qhs for one more month.  Recommend to stop oxcarbazepine in one month or greater.     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  DMV form completed.     Return for follow-up in one year.  Will anticipate a DMV form at that time.

## 2019-01-21 NOTE — LETTER
January 21, 2019       Patient: Amadeo Fernandez   YOB: 1975   Date of Visit: 1/21/2019         To Whom It May Concern:    It is my medical opinion that Amadeo Fernandez continue to work full duty with no restrictions.  He has been event-free for nearly 3 years and has followed medical instruction with medication management and diagnostics.    If you have any questions or concerns, please don't hesitate to call [phone]          Sincerely,          FREDDY SmithPHusseinN.  Electronically Signed

## 2019-01-22 ASSESSMENT — ENCOUNTER SYMPTOMS
COUGH: 0
MUSCULOSKELETAL NEGATIVE: 1
ABDOMINAL PAIN: 0
SORE THROAT: 0
CONSTITUTIONAL NEGATIVE: 1
NERVOUS/ANXIOUS: 1
DOUBLE VISION: 0
DIARRHEA: 0
HEADACHES: 0
NAUSEA: 0
SEIZURES: 0
VOMITING: 0
DEPRESSION: 0

## 2019-02-25 ENCOUNTER — HOSPITAL ENCOUNTER (OUTPATIENT)
Dept: LAB | Facility: MEDICAL CENTER | Age: 44
End: 2019-02-25
Attending: NURSE PRACTITIONER
Payer: COMMERCIAL

## 2019-02-25 LAB
25(OH)D3 SERPL-MCNC: 23 NG/ML (ref 30–100)
ALBUMIN SERPL BCP-MCNC: 4.9 G/DL (ref 3.2–4.9)
ALBUMIN/GLOB SERPL: 2.7 G/DL
ALP SERPL-CCNC: 42 U/L (ref 30–99)
ALT SERPL-CCNC: 16 U/L (ref 2–50)
ANION GAP SERPL CALC-SCNC: 6 MMOL/L (ref 0–11.9)
APPEARANCE UR: CLEAR
AST SERPL-CCNC: 16 U/L (ref 12–45)
BASOPHILS # BLD AUTO: 1.6 % (ref 0–1.8)
BASOPHILS # BLD: 0.06 K/UL (ref 0–0.12)
BILIRUB SERPL-MCNC: 0.5 MG/DL (ref 0.1–1.5)
BILIRUB UR QL STRIP.AUTO: NEGATIVE
BUN SERPL-MCNC: 10 MG/DL (ref 8–22)
CALCIUM SERPL-MCNC: 10 MG/DL (ref 8.5–10.5)
CHLORIDE SERPL-SCNC: 108 MMOL/L (ref 96–112)
CHOLEST SERPL-MCNC: 168 MG/DL (ref 100–199)
CO2 SERPL-SCNC: 28 MMOL/L (ref 20–33)
COLOR UR: YELLOW
CREAT SERPL-MCNC: 0.85 MG/DL (ref 0.5–1.4)
EOSINOPHIL # BLD AUTO: 0.26 K/UL (ref 0–0.51)
EOSINOPHIL NFR BLD: 6.8 % (ref 0–6.9)
ERYTHROCYTE [DISTWIDTH] IN BLOOD BY AUTOMATED COUNT: 46.7 FL (ref 35.9–50)
EST. AVERAGE GLUCOSE BLD GHB EST-MCNC: 114 MG/DL
FASTING STATUS PATIENT QL REPORTED: NORMAL
FOLATE SERPL-MCNC: 20.6 NG/ML
GLOBULIN SER CALC-MCNC: 1.8 G/DL (ref 1.9–3.5)
GLUCOSE SERPL-MCNC: 107 MG/DL (ref 65–99)
GLUCOSE UR STRIP.AUTO-MCNC: NEGATIVE MG/DL
HBA1C MFR BLD: 5.6 % (ref 0–5.6)
HCT VFR BLD AUTO: 46.8 % (ref 42–52)
HDLC SERPL-MCNC: 64 MG/DL
HGB BLD-MCNC: 15.9 G/DL (ref 14–18)
IMM GRANULOCYTES # BLD AUTO: 0.01 K/UL (ref 0–0.11)
IMM GRANULOCYTES NFR BLD AUTO: 0.3 % (ref 0–0.9)
KETONES UR STRIP.AUTO-MCNC: NEGATIVE MG/DL
LDLC SERPL CALC-MCNC: 91 MG/DL
LEUKOCYTE ESTERASE UR QL STRIP.AUTO: NEGATIVE
LYMPHOCYTES # BLD AUTO: 1.71 K/UL (ref 1–4.8)
LYMPHOCYTES NFR BLD: 45 % (ref 22–41)
MCH RBC QN AUTO: 33.2 PG (ref 27–33)
MCHC RBC AUTO-ENTMCNC: 34 G/DL (ref 33.7–35.3)
MCV RBC AUTO: 97.7 FL (ref 81.4–97.8)
MICRO URNS: NORMAL
MONOCYTES # BLD AUTO: 0.32 K/UL (ref 0–0.85)
MONOCYTES NFR BLD AUTO: 8.4 % (ref 0–13.4)
NEUTROPHILS # BLD AUTO: 1.44 K/UL (ref 1.82–7.42)
NEUTROPHILS NFR BLD: 37.9 % (ref 44–72)
NITRITE UR QL STRIP.AUTO: NEGATIVE
NRBC # BLD AUTO: 0 K/UL
NRBC BLD-RTO: 0 /100 WBC
PH UR STRIP.AUTO: 7 [PH]
PLATELET # BLD AUTO: 199 K/UL (ref 164–446)
PMV BLD AUTO: 10.4 FL (ref 9–12.9)
POTASSIUM SERPL-SCNC: 4.4 MMOL/L (ref 3.6–5.5)
PROT SERPL-MCNC: 6.7 G/DL (ref 6–8.2)
PROT UR QL STRIP: NEGATIVE MG/DL
PSA SERPL-MCNC: 0.43 NG/ML (ref 0–4)
RBC # BLD AUTO: 4.79 M/UL (ref 4.7–6.1)
RBC UR QL AUTO: NEGATIVE
SODIUM SERPL-SCNC: 142 MMOL/L (ref 135–145)
SP GR UR STRIP.AUTO: 1.02
T3 SERPL-MCNC: 94.4 NG/DL (ref 60–181)
T3FREE SERPL-MCNC: 3.66 PG/ML (ref 2.4–4.2)
TRIGL SERPL-MCNC: 64 MG/DL (ref 0–149)
TSH SERPL DL<=0.005 MIU/L-ACNC: 0.74 UIU/ML (ref 0.38–5.33)
UROBILINOGEN UR STRIP.AUTO-MCNC: 0.2 MG/DL
VIT B12 SERPL-MCNC: 732 PG/ML (ref 211–911)
WBC # BLD AUTO: 3.8 K/UL (ref 4.8–10.8)

## 2019-02-25 PROCEDURE — 80053 COMPREHEN METABOLIC PANEL: CPT

## 2019-02-25 PROCEDURE — 81003 URINALYSIS AUTO W/O SCOPE: CPT

## 2019-02-25 PROCEDURE — 84443 ASSAY THYROID STIM HORMONE: CPT

## 2019-02-25 PROCEDURE — 36415 COLL VENOUS BLD VENIPUNCTURE: CPT

## 2019-02-25 PROCEDURE — 82746 ASSAY OF FOLIC ACID SERUM: CPT

## 2019-02-25 PROCEDURE — 84439 ASSAY OF FREE THYROXINE: CPT

## 2019-02-25 PROCEDURE — 84153 ASSAY OF PSA TOTAL: CPT

## 2019-02-25 PROCEDURE — 80061 LIPID PANEL: CPT

## 2019-02-25 PROCEDURE — 85025 COMPLETE CBC W/AUTO DIFF WBC: CPT

## 2019-02-25 PROCEDURE — 84425 ASSAY OF VITAMIN B-1: CPT

## 2019-02-25 PROCEDURE — 82607 VITAMIN B-12: CPT

## 2019-02-25 PROCEDURE — 84481 FREE ASSAY (FT-3): CPT

## 2019-02-25 PROCEDURE — 84207 ASSAY OF VITAMIN B-6: CPT

## 2019-02-25 PROCEDURE — 83036 HEMOGLOBIN GLYCOSYLATED A1C: CPT

## 2019-02-25 PROCEDURE — 82306 VITAMIN D 25 HYDROXY: CPT

## 2019-02-25 PROCEDURE — 84480 ASSAY TRIIODOTHYRONINE (T3): CPT

## 2019-02-28 LAB
VIT B1 BLD-MCNC: 150 NMOL/L (ref 70–180)
VIT B6 SERPL-MCNC: 160.7 NMOL/L (ref 20–125)

## 2019-03-01 LAB — T4 FREE SERPL DIALY-MCNC: 1.1 NG/DL (ref 1.1–2.4)

## 2019-03-25 ENCOUNTER — TELEPHONE (OUTPATIENT)
Dept: NEUROLOGY | Facility: MEDICAL CENTER | Age: 44
End: 2019-03-25

## 2019-03-25 NOTE — TELEPHONE ENCOUNTER
Pt called and stated that the DMV did not take his DMV as his visit was too early. He is wondering if he could stop by for a quick visit to redo his paperwork this week.       Please advise.

## 2019-03-27 ENCOUNTER — OFFICE VISIT (OUTPATIENT)
Dept: NEUROLOGY | Facility: MEDICAL CENTER | Age: 44
End: 2019-03-27
Payer: COMMERCIAL

## 2019-03-27 VITALS
DIASTOLIC BLOOD PRESSURE: 78 MMHG | TEMPERATURE: 97.3 F | OXYGEN SATURATION: 97 % | HEART RATE: 66 BPM | BODY MASS INDEX: 26.66 KG/M2 | WEIGHT: 180 LBS | SYSTOLIC BLOOD PRESSURE: 118 MMHG | RESPIRATION RATE: 16 BRPM | HEIGHT: 69 IN

## 2019-03-27 DIAGNOSIS — R41.89 SPELL OF ALTERED COGNITION: ICD-10-CM

## 2019-03-27 PROCEDURE — 99212 OFFICE O/P EST SF 10 MIN: CPT | Performed by: NURSE PRACTITIONER

## 2019-03-27 RX ORDER — SERTRALINE HYDROCHLORIDE 100 MG/1
150 TABLET, FILM COATED ORAL DAILY
COMMUNITY

## 2019-03-27 ASSESSMENT — ENCOUNTER SYMPTOMS
VOMITING: 0
SEIZURES: 0
NERVOUS/ANXIOUS: 0
MUSCULOSKELETAL NEGATIVE: 1
SORE THROAT: 0
NAUSEA: 0
DEPRESSION: 0
CONSTITUTIONAL NEGATIVE: 1
COUGH: 0
DOUBLE VISION: 0
HEADACHES: 0
DIARRHEA: 0
ABDOMINAL PAIN: 0

## 2019-03-27 NOTE — PROGRESS NOTES
"Subjective:      Amadeo Fernandez is a 43 y.o. male who presents with Follow-Up (DMV paperwork)            HPI Here for DMV form today.    No concerns for unusual spells.  Denies lapse of consciousness.  Denies memory disruption.    Has resumed sertraline-- he took a medication holiday and trialed at least 2 other mood stabilizers including buspar and did not feel emotionally stable.    Current Outpatient Prescriptions   Medication Sig Dispense Refill   • sertraline (ZOLOFT) 100 MG Tab Take 150 mg by mouth every day.     • OXcarbazepine (TRILEPTAL) 300 MG Tab Take one tablet by mouth twice daily (Patient taking differently: one tab once a day) 180 Tab 1     No current facility-administered medications for this visit.        Review of Systems   Constitutional: Negative.    HENT: Negative for hearing loss, nosebleeds and sore throat.         No recent head injury.   Eyes: Negative for double vision.        No new loss of vision.   Respiratory: Negative for cough.         No recent lung infections.   Cardiovascular: Negative for chest pain.   Gastrointestinal: Negative for abdominal pain, diarrhea, nausea and vomiting.   Genitourinary: Negative.    Musculoskeletal: Negative.    Skin: Negative.    Neurological: Negative for seizures and headaches.   Endo/Heme/Allergies:        No history of endocrine dysfunction.  No new problems.   Psychiatric/Behavioral: Negative for depression. The patient is not nervous/anxious.         No recent mood changes.          Objective:     /78 (BP Location: Right arm, Patient Position: Sitting, BP Cuff Size: Adult)   Pulse 66   Temp 36.3 °C (97.3 °F) (Temporal)   Resp 16   Ht 1.753 m (5' 9\")   Wt 81.6 kg (180 lb)   SpO2 97%   BMI 26.58 kg/m²      Physical Exam   Constitutional: He is oriented to person, place, and time. He appears well-developed and well-nourished. No distress.   HENT:   Head: Normocephalic and atraumatic.   Nose: Nose normal.   No new hearing loss.   Eyes: " EOM are normal.   No double vision or loss of vision.   Neck: Normal range of motion.   Cardiovascular: Normal rate, regular rhythm and normal heart sounds.    No recent chest pain.   Pulmonary/Chest: Effort normal and breath sounds normal.   Abdominal: Soft. There is no tenderness.   Genitourinary:   Genitourinary Comments: No recent infections.   Musculoskeletal: Normal range of motion.   Lymphadenopathy:     He has no cervical adenopathy.   Neurological: He is alert and oriented to person, place, and time. He has normal strength and normal reflexes. He displays no tremor. No cranial nerve deficit. He displays no seizure activity. Gait normal.   No observable changes in neurologic status.  See initial new patient examination for details.     Skin: Skin is warm and dry.   Psychiatric: He has a normal mood and affect. His speech is normal. His mood appears not anxious. He does not exhibit a depressed mood.               Assessment/Plan:     Seizure disorder:  Seizure free since November 2015.    Continue OXC 300mg daily.    Established with psychiatry and will continue Zoloft.    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  DMV form completed.     Return for follow-up in one year or as needed.

## 2019-06-02 ENCOUNTER — APPOINTMENT (OUTPATIENT)
Dept: RADIOLOGY | Facility: MEDICAL CENTER | Age: 44
End: 2019-06-02
Attending: EMERGENCY MEDICINE
Payer: COMMERCIAL

## 2019-06-02 ENCOUNTER — HOSPITAL ENCOUNTER (EMERGENCY)
Facility: MEDICAL CENTER | Age: 44
End: 2019-06-02
Attending: EMERGENCY MEDICINE
Payer: COMMERCIAL

## 2019-06-02 VITALS
HEART RATE: 66 BPM | WEIGHT: 165.34 LBS | OXYGEN SATURATION: 97 % | BODY MASS INDEX: 24.49 KG/M2 | HEIGHT: 69 IN | SYSTOLIC BLOOD PRESSURE: 128 MMHG | RESPIRATION RATE: 16 BRPM | DIASTOLIC BLOOD PRESSURE: 68 MMHG | TEMPERATURE: 96.6 F

## 2019-06-02 DIAGNOSIS — R51.9 ACUTE NONINTRACTABLE HEADACHE, UNSPECIFIED HEADACHE TYPE: ICD-10-CM

## 2019-06-02 DIAGNOSIS — R40.4 ALTERED LEVEL OF CONSCIOUSNESS: ICD-10-CM

## 2019-06-02 LAB
ALBUMIN SERPL BCP-MCNC: 5.1 G/DL (ref 3.2–4.9)
ALBUMIN/GLOB SERPL: 2 G/DL
ALP SERPL-CCNC: 47 U/L (ref 30–99)
ALT SERPL-CCNC: 20 U/L (ref 2–50)
ANION GAP SERPL CALC-SCNC: 17 MMOL/L (ref 0–11.9)
APTT PPP: 26.2 SEC (ref 24.7–36)
AST SERPL-CCNC: 24 U/L (ref 12–45)
BASOPHILS # BLD AUTO: 0.5 % (ref 0–1.8)
BASOPHILS # BLD: 0.06 K/UL (ref 0–0.12)
BILIRUB SERPL-MCNC: 1 MG/DL (ref 0.1–1.5)
BUN SERPL-MCNC: 17 MG/DL (ref 8–22)
CALCIUM SERPL-MCNC: 9.8 MG/DL (ref 8.5–10.5)
CHLORIDE SERPL-SCNC: 102 MMOL/L (ref 96–112)
CO2 SERPL-SCNC: 19 MMOL/L (ref 20–33)
CREAT SERPL-MCNC: 1 MG/DL (ref 0.5–1.4)
EOSINOPHIL # BLD AUTO: 0.02 K/UL (ref 0–0.51)
EOSINOPHIL NFR BLD: 0.2 % (ref 0–6.9)
ERYTHROCYTE [DISTWIDTH] IN BLOOD BY AUTOMATED COUNT: 44.2 FL (ref 35.9–50)
GLOBULIN SER CALC-MCNC: 2.5 G/DL (ref 1.9–3.5)
GLUCOSE SERPL-MCNC: 129 MG/DL (ref 65–99)
HCT VFR BLD AUTO: 48.5 % (ref 42–52)
HGB BLD-MCNC: 17.1 G/DL (ref 14–18)
IMM GRANULOCYTES # BLD AUTO: 0.05 K/UL (ref 0–0.11)
IMM GRANULOCYTES NFR BLD AUTO: 0.5 % (ref 0–0.9)
INR PPP: 0.93 (ref 0.87–1.13)
LYMPHOCYTES # BLD AUTO: 1.81 K/UL (ref 1–4.8)
LYMPHOCYTES NFR BLD: 16.5 % (ref 22–41)
MCH RBC QN AUTO: 32.1 PG (ref 27–33)
MCHC RBC AUTO-ENTMCNC: 35.3 G/DL (ref 33.7–35.3)
MCV RBC AUTO: 91.2 FL (ref 81.4–97.8)
MONOCYTES # BLD AUTO: 0.59 K/UL (ref 0–0.85)
MONOCYTES NFR BLD AUTO: 5.4 % (ref 0–13.4)
NEUTROPHILS # BLD AUTO: 8.43 K/UL (ref 1.82–7.42)
NEUTROPHILS NFR BLD: 76.9 % (ref 44–72)
NRBC # BLD AUTO: 0 K/UL
NRBC BLD-RTO: 0 /100 WBC
PLATELET # BLD AUTO: 256 K/UL (ref 164–446)
PMV BLD AUTO: 10 FL (ref 9–12.9)
POTASSIUM SERPL-SCNC: 3.6 MMOL/L (ref 3.6–5.5)
PROT SERPL-MCNC: 7.6 G/DL (ref 6–8.2)
PROTHROMBIN TIME: 12.6 SEC (ref 12–14.6)
RBC # BLD AUTO: 5.32 M/UL (ref 4.7–6.1)
SODIUM SERPL-SCNC: 138 MMOL/L (ref 135–145)
WBC # BLD AUTO: 11 K/UL (ref 4.8–10.8)

## 2019-06-02 PROCEDURE — 85610 PROTHROMBIN TIME: CPT

## 2019-06-02 PROCEDURE — 96376 TX/PRO/DX INJ SAME DRUG ADON: CPT

## 2019-06-02 PROCEDURE — 85730 THROMBOPLASTIN TIME PARTIAL: CPT

## 2019-06-02 PROCEDURE — 700111 HCHG RX REV CODE 636 W/ 250 OVERRIDE (IP): Performed by: EMERGENCY MEDICINE

## 2019-06-02 PROCEDURE — 85025 COMPLETE CBC W/AUTO DIFF WBC: CPT

## 2019-06-02 PROCEDURE — 80053 COMPREHEN METABOLIC PANEL: CPT

## 2019-06-02 PROCEDURE — 36415 COLL VENOUS BLD VENIPUNCTURE: CPT

## 2019-06-02 PROCEDURE — 99284 EMERGENCY DEPT VISIT MOD MDM: CPT

## 2019-06-02 PROCEDURE — 70496 CT ANGIOGRAPHY HEAD: CPT

## 2019-06-02 PROCEDURE — 700117 HCHG RX CONTRAST REV CODE 255: Performed by: EMERGENCY MEDICINE

## 2019-06-02 PROCEDURE — 96375 TX/PRO/DX INJ NEW DRUG ADDON: CPT

## 2019-06-02 PROCEDURE — 96374 THER/PROPH/DIAG INJ IV PUSH: CPT

## 2019-06-02 PROCEDURE — 94760 N-INVAS EAR/PLS OXIMETRY 1: CPT

## 2019-06-02 RX ORDER — ONDANSETRON 4 MG/1
4 TABLET, ORALLY DISINTEGRATING ORAL EVERY 8 HOURS PRN
Qty: 10 TAB | Refills: 0 | Status: SHIPPED | OUTPATIENT
Start: 2019-06-02 | End: 2020-03-03

## 2019-06-02 RX ORDER — MORPHINE SULFATE 4 MG/ML
4 INJECTION, SOLUTION INTRAMUSCULAR; INTRAVENOUS ONCE
Status: COMPLETED | OUTPATIENT
Start: 2019-06-02 | End: 2019-06-02

## 2019-06-02 RX ORDER — ONDANSETRON 2 MG/ML
4 INJECTION INTRAMUSCULAR; INTRAVENOUS ONCE
Status: COMPLETED | OUTPATIENT
Start: 2019-06-02 | End: 2019-06-02

## 2019-06-02 RX ORDER — METOCLOPRAMIDE HYDROCHLORIDE 5 MG/ML
10 INJECTION INTRAMUSCULAR; INTRAVENOUS ONCE
Status: COMPLETED | OUTPATIENT
Start: 2019-06-02 | End: 2019-06-02

## 2019-06-02 RX ADMIN — METOCLOPRAMIDE 10 MG: 5 INJECTION, SOLUTION INTRAMUSCULAR; INTRAVENOUS at 01:08

## 2019-06-02 RX ADMIN — IOHEXOL 80 ML: 350 INJECTION, SOLUTION INTRAVENOUS at 02:11

## 2019-06-02 RX ADMIN — MORPHINE SULFATE 4 MG: 4 INJECTION INTRAVENOUS at 00:57

## 2019-06-02 RX ADMIN — ONDANSETRON 4 MG: 2 INJECTION INTRAMUSCULAR; INTRAVENOUS at 01:07

## 2019-06-02 RX ADMIN — ONDANSETRON 4 MG: 2 INJECTION INTRAMUSCULAR; INTRAVENOUS at 03:10

## 2019-06-02 NOTE — ED PROVIDER NOTES
ED Provider Note    Scribed for Yuri Thompson M.D. by Bowen Tapia. 6/2/2019, 12:47 AM.    Primary care provider: Daniel Man M.D.  Means of arrival: Wheel chair  History obtained from: Patient  History limited by: None    CHIEF COMPLAINT  Chief Complaint   Patient presents with   • ALOC   • Headache       HPI  Amadeo Fernandez is a 43 y.o. Male with a history of seizures who presents to the Emergency Department for evaluation of a severe constant headache onset approximately 4 hours ago. The patient states that the headache started in the back of his head and has since spread to his bilateral temples. His wife confirms that his headache started with an acute onset and that the patient did not experience any head trauma. He reports that the pain is significantly exacerbated with bright light. He endorses experiencing associated nausea, vomiting, numbness, tingling, and neck pain. His numbness and tingling is located in all of his fingers and toes. His wife adds that he has been experiencing confusion after the onset of the headache and he currently thinks he is in [facility]. The patient states that he has never previously had a headache like this and he has not had any recent illness. He adds that he regularly takes 2 of trileptal for his seizures, however, 3 days ago he decreased the amount and started taking one. He has not recently used any alcohol or drugs. He denies experiencing fever, shortness of breath, and chest pain.     REVIEW OF SYSTEMS  Pertinent positives include headache, nausea, vomiting, numbness, tingling, neck pain, confusion, . Pertinent negatives include no fever, shortness of breath, and chest pain. All other systems negative.    PAST MEDICAL HISTORY   has a past medical history of Anxiety; Depression; and Seizure disorder (HCC).    SURGICAL HISTORY   has a past surgical history that includes other orthopedic surgery.    SOCIAL HISTORY  Social History   Substance  "Use Topics   • Smoking status: Never Smoker   • Smokeless tobacco: Current User     Types: Chew   • Alcohol use Yes      Comment: occass      History   Drug Use   • Types: Inhaled     Comment: marijuana       FAMILY HISTORY  History reviewed. No pertinent family history.    CURRENT MEDICATIONS  Home Medications    **Home medications have not yet been reviewed for this encounter**         ALLERGIES  No Known Allergies    PHYSICAL EXAM  VITAL SIGNS: /68   Pulse (!) 110   Temp 35.9 °C (96.6 °F) (Temporal)   Resp 20   Ht 1.753 m (5' 9\")   Wt 75 kg (165 lb 5.5 oz)   SpO2 99%   BMI 24.42 kg/m²     Constitutional: Well developed, Well nourished, moderate distress.   HENT: Normocephalic, Atraumatic, Oropharynx moist.   Eyes: Conjunctiva normal, No discharge. Pupils 3 mm and reactive.   Neck: Supple, No stridor, no vertebral point tenderness   Cardiovascular: Tachycardic, Normal rhythm, No murmurs, equal pulses.   Pulmonary: Normal breath sounds, No respiratory distress, No wheezing, No rales, No rhonchi.  Chest: No chest wall tenderness or deformity.   Abdomen:Soft, No tenderness, No masses, no rebound, no guarding.   Back: No CVA tenderness.   Musculoskeletal: No major deformities noted, No tenderness.   Skin: Warm, Dry, No erythema, No rash.   Neurologic: Alert & oriented x 3, Normal motor function,  No focal deficits noted. No facial droop. Subjective decreased sensation on the left side. No pronator drift.   Psychiatric: Affect normal, Judgment normal, Mood normal.     LABS  Results for orders placed or performed during the hospital encounter of 06/02/19   CBC WITH DIFFERENTIAL   Result Value Ref Range    WBC 11.0 (H) 4.8 - 10.8 K/uL    RBC 5.32 4.70 - 6.10 M/uL    Hemoglobin 17.1 14.0 - 18.0 g/dL    Hematocrit 48.5 42.0 - 52.0 %    MCV 91.2 81.4 - 97.8 fL    MCH 32.1 27.0 - 33.0 pg    MCHC 35.3 33.7 - 35.3 g/dL    RDW 44.2 35.9 - 50.0 fL    Platelet Count 256 164 - 446 K/uL    MPV 10.0 9.0 - 12.9 fL    " Neutrophils-Polys 76.90 (H) 44.00 - 72.00 %    Lymphocytes 16.50 (L) 22.00 - 41.00 %    Monocytes 5.40 0.00 - 13.40 %    Eosinophils 0.20 0.00 - 6.90 %    Basophils 0.50 0.00 - 1.80 %    Immature Granulocytes 0.50 0.00 - 0.90 %    Nucleated RBC 0.00 /100 WBC    Neutrophils (Absolute) 8.43 (H) 1.82 - 7.42 K/uL    Lymphs (Absolute) 1.81 1.00 - 4.80 K/uL    Monos (Absolute) 0.59 0.00 - 0.85 K/uL    Eos (Absolute) 0.02 0.00 - 0.51 K/uL    Baso (Absolute) 0.06 0.00 - 0.12 K/uL    Immature Granulocytes (abs) 0.05 0.00 - 0.11 K/uL    NRBC (Absolute) 0.00 K/uL   COMP METABOLIC PANEL   Result Value Ref Range    Sodium 138 135 - 145 mmol/L    Potassium 3.6 3.6 - 5.5 mmol/L    Chloride 102 96 - 112 mmol/L    Co2 19 (L) 20 - 33 mmol/L    Anion Gap 17.0 (H) 0.0 - 11.9    Glucose 129 (H) 65 - 99 mg/dL    Bun 17 8 - 22 mg/dL    Creatinine 1.00 0.50 - 1.40 mg/dL    Calcium 9.8 8.5 - 10.5 mg/dL    AST(SGOT) 24 12 - 45 U/L    ALT(SGPT) 20 2 - 50 U/L    Alkaline Phosphatase 47 30 - 99 U/L    Total Bilirubin 1.0 0.1 - 1.5 mg/dL    Albumin 5.1 (H) 3.2 - 4.9 g/dL    Total Protein 7.6 6.0 - 8.2 g/dL    Globulin 2.5 1.9 - 3.5 g/dL    A-G Ratio 2.0 g/dL   APTT   Result Value Ref Range    APTT 26.2 24.7 - 36.0 sec   PROTHROMBIN TIME (INR)   Result Value Ref Range    PT 12.6 12.0 - 14.6 sec    INR 0.93 0.87 - 1.13   ESTIMATED GFR   Result Value Ref Range    GFR If African American >60 >60 mL/min/1.73 m 2    GFR If Non African American >60 >60 mL/min/1.73 m 2       All labs reviewed by me.    RADIOLOGY  CT-CTA HEAD WITH & W/O-POST PROCESS   Final Result      CT angiogram of the [place] within normal limits.        The radiologist's interpretation of all radiological studies have been reviewed by me.    COURSE & MEDICAL DECISION MAKING  Pertinent Labs & Imaging studies reviewed. (See chart for details)    12:43 AM I obtained and reviewed the patient's previous medical records which indicated that he had an MRI in 2016 with and without.  He also has a history of seizures.     12:47 AM - Patient seen and examined at bedside. Patient will be treated with Reglan 10 mg, Zofran 4 mg, and Morphine 4 mg. Ordered CT-CTA Head with, CBC, CMP, APTT, Prothrombin Time, and Estimated GFR to evaluate his symptoms. The differential diagnoses include but are not limited to: Migraine and Intercranial hemorrhage.      2:39 AM Patient reevaluated at bedside. He reported feeling improvement following medication administration but stated that he was still feeling very nauseous. I told him I will give him more Zofran for his symptoms. I also told him we are now waiting for the official read on his CT scan. He understood and agreed.       Patient felt much better after migraine cocktail his headache is almost completely gone.  He was given a second dose of Zofran.  With that he feels back to normal.  His speech is now more fluent he has no signs of meningismus on reexam at 4 AM.  At this point time patient would like to go home.    Medical Decision Making: Right now I suspect the patient may have had a complex migraine causing his difficulties speaking and severe vomiting.  His vomit is been controlled.  His CT of the head and CTA are negative for intracranial hemorrhage or aneurysm.  I do not think a lumbar puncture is necessary at this time.  Patient does not have a fever do not suspect encephalopathy or meningitis.  Initially I was concerned about subarachnoid hemorrhage given the thunderclap nature and the worst headache the patient's wife therefore CT and CTA was ordered.     The patient will return for new or worsening symptoms and is stable at the time of discharge.    The patient is referred to a primary physician for blood pressure management, diabetic screening, and for all other preventative health concerns.        DISPOSITION:  Patient will be discharged home in stable condition.    FOLLOW UP:  Daniel Man M.D.  [address]  [address]  [facility]  [address]  [phone]    Schedule an appointment as soon as possible for a visit in 3 days      Yue Cheng M.D.  [address] [address]  [place] [id number] [phone]    Schedule an appointment as soon as possible for a visit         OUTPATIENT MEDICATIONS:  Discharge Medication List as of 6/2/2019  4:17 AM      START taking these medications    Details   ondansetron (ZOFRAN ODT) 4 MG TABLET DISPERSIBLE Take 1 Tab by mouth every 8 hours as needed., Disp-10 Tab, R-0, Print Rx Paper               FINAL IMPRESSION  1. Altered level of consciousness    2. Acute nonintractable headache, unspecified headache type          I, Bowen Tapia (Scribe), am scribing for, and in the presence of, Yuri Thompson M.D.    Electronically signed by: Bowen Tapia (Scribe), 6/2/2019    IYuri M.D. personally performed the services described in this documentation, as scribed by Bowen Tapia in my presence, and it is both accurate and complete. C.   The note accurately reflects work and decisions made by me.  Yuri Thompson  6/2/2019  4:29 AM

## 2019-06-02 NOTE — ED NOTES
"Pt wheeled back in wheelchair from triage. Pt c/o headache, n/v and light sensitivity around 2000 yesterday. Pt took 4 Excedrin and 4mg zofran po 30 minutes after his headache start. Pt states, \" I feel like someone gave me something\". Pt is aaox4, but it takes a while for the pt to answer. Pt  Pt hooked to monitor, resting on room air, with line and lab established. Erp to see.   "

## 2019-06-02 NOTE — ED TRIAGE NOTES
"Pt to triage via w/c with spouse. Pt stood up independently onto scale. Spouse reports pt is confused, c/o bilat temporal headache since 2000 last night. + n/v, light sensitivity. Pt is confused, thinks he is \"in the office and its 2006,\" will not tell me his name. PERR. Pt c/o bilat hand numbness,  are strong and equal. Charge RN informed. Pt to room 4. Primary RN at BS.   "

## 2019-06-02 NOTE — DISCHARGE INSTRUCTIONS
Return the emergency department if you have new confusion, neck stiffness, severe headache, vision changes or fever.

## 2019-06-02 NOTE — ED NOTES
Pt given discharge and follow up instructions and prescriptions, all questions answered, pt verbalized understanding. PIV removed, dressing applied.  Pt discharged with wife. Copy of discharge provided to pt. Signed copy in chart.  Pt states that all personal belongings are in possession. Pt off of unit w/ steady gait.

## 2019-08-27 ENCOUNTER — APPOINTMENT (RX ONLY)
Dept: URBAN - METROPOLITAN AREA CLINIC 4 | Facility: CLINIC | Age: 44
Setting detail: DERMATOLOGY
End: 2019-08-27

## 2019-08-27 DIAGNOSIS — L81.3 CAFÉ AU LAIT SPOTS: ICD-10-CM

## 2019-08-27 DIAGNOSIS — L82.1 OTHER SEBORRHEIC KERATOSIS: ICD-10-CM

## 2019-08-27 DIAGNOSIS — Z71.89 OTHER SPECIFIED COUNSELING: ICD-10-CM

## 2019-08-27 DIAGNOSIS — D22 MELANOCYTIC NEVI: ICD-10-CM

## 2019-08-27 DIAGNOSIS — D18.0 HEMANGIOMA: ICD-10-CM

## 2019-08-27 DIAGNOSIS — L57.8 OTHER SKIN CHANGES DUE TO CHRONIC EXPOSURE TO NONIONIZING RADIATION: ICD-10-CM

## 2019-08-27 DIAGNOSIS — L81.4 OTHER MELANIN HYPERPIGMENTATION: ICD-10-CM

## 2019-08-27 DIAGNOSIS — L57.0 ACTINIC KERATOSIS: ICD-10-CM

## 2019-08-27 PROBLEM — D18.01 HEMANGIOMA OF SKIN AND SUBCUTANEOUS TISSUE: Status: ACTIVE | Noted: 2019-08-27

## 2019-08-27 PROBLEM — D22.62 MELANOCYTIC NEVI OF LEFT UPPER LIMB, INCLUDING SHOULDER: Status: ACTIVE | Noted: 2019-08-27

## 2019-08-27 PROBLEM — D22.72 MELANOCYTIC NEVI OF LEFT LOWER LIMB, INCLUDING HIP: Status: ACTIVE | Noted: 2019-08-27

## 2019-08-27 PROBLEM — D22.39 MELANOCYTIC NEVI OF OTHER PARTS OF FACE: Status: ACTIVE | Noted: 2019-08-27

## 2019-08-27 PROBLEM — D22.5 MELANOCYTIC NEVI OF TRUNK: Status: ACTIVE | Noted: 2019-08-27

## 2019-08-27 PROBLEM — D22.71 MELANOCYTIC NEVI OF RIGHT LOWER LIMB, INCLUDING HIP: Status: ACTIVE | Noted: 2019-08-27

## 2019-08-27 PROBLEM — D22.61 MELANOCYTIC NEVI OF RIGHT UPPER LIMB, INCLUDING SHOULDER: Status: ACTIVE | Noted: 2019-08-27

## 2019-08-27 PROCEDURE — ? LIQUID NITROGEN

## 2019-08-27 PROCEDURE — 99214 OFFICE O/P EST MOD 30 MIN: CPT | Mod: 25

## 2019-08-27 PROCEDURE — 17000 DESTRUCT PREMALG LESION: CPT

## 2019-08-27 PROCEDURE — ? ADDITIONAL NOTES

## 2019-08-27 PROCEDURE — ? COUNSELING

## 2019-08-27 PROCEDURE — 17003 DESTRUCT PREMALG LES 2-14: CPT

## 2019-08-27 ASSESSMENT — LOCATION DETAILED DESCRIPTION DERM
LOCATION DETAILED: LEFT SUPERIOR MEDIAL UPPER BACK
LOCATION DETAILED: LEFT MEDIAL UPPER BACK
LOCATION DETAILED: LEFT PROXIMAL POSTERIOR UPPER ARM
LOCATION DETAILED: EPIGASTRIC SKIN
LOCATION DETAILED: LEFT NASAL SIDEWALL
LOCATION DETAILED: LEFT DISTAL POSTERIOR UPPER ARM
LOCATION DETAILED: RIGHT ANTERIOR DISTAL UPPER ARM
LOCATION DETAILED: SUPERIOR THORACIC SPINE
LOCATION DETAILED: LEFT DISTAL POSTERIOR THIGH
LOCATION DETAILED: RIGHT PROXIMAL POSTERIOR THIGH
LOCATION DETAILED: MIDDLE STERNUM
LOCATION DETAILED: RIGHT INFERIOR UPPER BACK
LOCATION DETAILED: RIGHT PROXIMAL POSTERIOR UPPER ARM
LOCATION DETAILED: LEFT ANTERIOR DISTAL UPPER ARM
LOCATION DETAILED: LEFT INFERIOR MEDIAL FOREHEAD
LOCATION DETAILED: RIGHT MEDIAL UPPER BACK
LOCATION DETAILED: RIGHT SUPERIOR LATERAL MIDBACK
LOCATION DETAILED: RIGHT INFERIOR MEDIAL UPPER BACK
LOCATION DETAILED: INFERIOR THORACIC SPINE
LOCATION DETAILED: LEFT INFERIOR CENTRAL MALAR CHEEK
LOCATION DETAILED: LEFT CENTRAL TEMPLE
LOCATION DETAILED: MID POSTERIOR NECK
LOCATION DETAILED: RIGHT DISTAL POSTERIOR UPPER ARM
LOCATION DETAILED: LEFT CENTRAL MALAR CHEEK
LOCATION DETAILED: LEFT PROXIMAL POSTERIOR THIGH
LOCATION DETAILED: LOWER STERNUM
LOCATION DETAILED: RIGHT MEDIAL INFERIOR CHEST

## 2019-08-27 ASSESSMENT — LOCATION SIMPLE DESCRIPTION DERM
LOCATION SIMPLE: POSTERIOR NECK
LOCATION SIMPLE: RIGHT UPPER BACK
LOCATION SIMPLE: LEFT FOREHEAD
LOCATION SIMPLE: ABDOMEN
LOCATION SIMPLE: LEFT TEMPLE
LOCATION SIMPLE: UPPER BACK
LOCATION SIMPLE: LEFT NOSE
LOCATION SIMPLE: RIGHT UPPER ARM
LOCATION SIMPLE: LEFT POSTERIOR THIGH
LOCATION SIMPLE: LEFT UPPER ARM
LOCATION SIMPLE: LEFT CHEEK
LOCATION SIMPLE: RIGHT LOWER BACK
LOCATION SIMPLE: CHEST
LOCATION SIMPLE: RIGHT POSTERIOR THIGH
LOCATION SIMPLE: LEFT UPPER BACK

## 2019-08-27 ASSESSMENT — LOCATION ZONE DERM
LOCATION ZONE: LEG
LOCATION ZONE: NOSE
LOCATION ZONE: TRUNK
LOCATION ZONE: NECK
LOCATION ZONE: FACE
LOCATION ZONE: ARM

## 2019-08-27 NOTE — PROCEDURE: LIQUID NITROGEN
Detail Level: Detailed
Post-Care Instructions: I reviewed with the patient in detail post-care instructions. Patient is to wear sunprotection, and avoid picking at any of the treated lesions. Pt may apply Vaseline to crusted or scabbing areas.
Duration Of Freeze Thaw-Cycle (Seconds): 3
Render Note In Bullet Format When Appropriate: No
Consent: The patient's consent was obtained including but not limited to risks of crusting, scabbing, blistering, scarring, darker or lighter pigmentary change, recurrence, incomplete removal and infection.

## 2019-11-11 ENCOUNTER — HOSPITAL ENCOUNTER (OUTPATIENT)
Dept: LAB | Facility: MEDICAL CENTER | Age: 44
End: 2019-11-11
Attending: NURSE PRACTITIONER
Payer: COMMERCIAL

## 2019-11-11 LAB
25(OH)D3 SERPL-MCNC: 28 NG/ML (ref 30–100)
ALBUMIN SERPL BCP-MCNC: 4.5 G/DL (ref 3.2–4.9)
ALBUMIN/GLOB SERPL: 2.5 G/DL
ALP SERPL-CCNC: 39 U/L (ref 30–99)
ALT SERPL-CCNC: 34 U/L (ref 2–50)
ANION GAP SERPL CALC-SCNC: 7 MMOL/L (ref 0–11.9)
AST SERPL-CCNC: 23 U/L (ref 12–45)
BASOPHILS # BLD AUTO: 1.7 % (ref 0–1.8)
BASOPHILS # BLD: 0.07 K/UL (ref 0–0.12)
BILIRUB SERPL-MCNC: 0.5 MG/DL (ref 0.1–1.5)
BUN SERPL-MCNC: 13 MG/DL (ref 8–22)
CALCIUM SERPL-MCNC: 8.9 MG/DL (ref 8.5–10.5)
CHLORIDE SERPL-SCNC: 105 MMOL/L (ref 96–112)
CHOLEST SERPL-MCNC: 164 MG/DL (ref 100–199)
CO2 SERPL-SCNC: 29 MMOL/L (ref 20–33)
CREAT SERPL-MCNC: 0.73 MG/DL (ref 0.5–1.4)
EOSINOPHIL # BLD AUTO: 0.3 K/UL (ref 0–0.51)
EOSINOPHIL NFR BLD: 7.2 % (ref 0–6.9)
ERYTHROCYTE [DISTWIDTH] IN BLOOD BY AUTOMATED COUNT: 47.3 FL (ref 35.9–50)
FOLATE SERPL-MCNC: 5.5 NG/ML
GLOBULIN SER CALC-MCNC: 1.8 G/DL (ref 1.9–3.5)
GLUCOSE SERPL-MCNC: 106 MG/DL (ref 65–99)
HCT VFR BLD AUTO: 44.8 % (ref 42–52)
HDLC SERPL-MCNC: 68 MG/DL
HGB BLD-MCNC: 15.3 G/DL (ref 14–18)
IMM GRANULOCYTES # BLD AUTO: 0.02 K/UL (ref 0–0.11)
IMM GRANULOCYTES NFR BLD AUTO: 0.5 % (ref 0–0.9)
LDLC SERPL CALC-MCNC: 81 MG/DL
LYMPHOCYTES # BLD AUTO: 1.72 K/UL (ref 1–4.8)
LYMPHOCYTES NFR BLD: 41.2 % (ref 22–41)
MCH RBC QN AUTO: 33.5 PG (ref 27–33)
MCHC RBC AUTO-ENTMCNC: 34.2 G/DL (ref 33.7–35.3)
MCV RBC AUTO: 98 FL (ref 81.4–97.8)
MONOCYTES # BLD AUTO: 0.41 K/UL (ref 0–0.85)
MONOCYTES NFR BLD AUTO: 9.8 % (ref 0–13.4)
NEUTROPHILS # BLD AUTO: 1.65 K/UL (ref 1.82–7.42)
NEUTROPHILS NFR BLD: 39.6 % (ref 44–72)
NRBC # BLD AUTO: 0 K/UL
NRBC BLD-RTO: 0 /100 WBC
PLATELET # BLD AUTO: 249 K/UL (ref 164–446)
PMV BLD AUTO: 11.2 FL (ref 9–12.9)
POTASSIUM SERPL-SCNC: 4.1 MMOL/L (ref 3.6–5.5)
PROT SERPL-MCNC: 6.3 G/DL (ref 6–8.2)
RBC # BLD AUTO: 4.57 M/UL (ref 4.7–6.1)
SODIUM SERPL-SCNC: 141 MMOL/L (ref 135–145)
T3FREE SERPL-MCNC: 3.52 PG/ML (ref 2.4–4.2)
TRIGL SERPL-MCNC: 77 MG/DL (ref 0–149)
TSH SERPL DL<=0.005 MIU/L-ACNC: 0.72 UIU/ML (ref 0.38–5.33)
VIT B12 SERPL-MCNC: 523 PG/ML (ref 211–911)
WBC # BLD AUTO: 4.2 K/UL (ref 4.8–10.8)

## 2019-11-11 PROCEDURE — 82607 VITAMIN B-12: CPT

## 2019-11-11 PROCEDURE — 84403 ASSAY OF TOTAL TESTOSTERONE: CPT

## 2019-11-11 PROCEDURE — 82784 ASSAY IGA/IGD/IGG/IGM EACH: CPT

## 2019-11-11 PROCEDURE — 80061 LIPID PANEL: CPT

## 2019-11-11 PROCEDURE — 84481 FREE ASSAY (FT-3): CPT

## 2019-11-11 PROCEDURE — 84425 ASSAY OF VITAMIN B-1: CPT

## 2019-11-11 PROCEDURE — 84443 ASSAY THYROID STIM HORMONE: CPT

## 2019-11-11 PROCEDURE — 84270 ASSAY OF SEX HORMONE GLOBUL: CPT

## 2019-11-11 PROCEDURE — 82746 ASSAY OF FOLIC ACID SERUM: CPT

## 2019-11-11 PROCEDURE — 85025 COMPLETE CBC W/AUTO DIFF WBC: CPT

## 2019-11-11 PROCEDURE — 36415 COLL VENOUS BLD VENIPUNCTURE: CPT

## 2019-11-11 PROCEDURE — 84439 ASSAY OF FREE THYROXINE: CPT

## 2019-11-11 PROCEDURE — 84207 ASSAY OF VITAMIN B-6: CPT

## 2019-11-11 PROCEDURE — 80053 COMPREHEN METABOLIC PANEL: CPT

## 2019-11-11 PROCEDURE — 82306 VITAMIN D 25 HYDROXY: CPT

## 2019-11-11 PROCEDURE — 82785 ASSAY OF IGE: CPT

## 2019-11-12 LAB
IGA SERPL-MCNC: 165 MG/DL (ref 68–408)
IGG SERPL-MCNC: 432 MG/DL (ref 768–1632)
IGM SERPL-MCNC: 39 MG/DL (ref 35–263)
SHBG SERPL-SCNC: 31 NMOL/L (ref 11–80)
TESTOST FREE MFR SERPL: 1.9 % (ref 1.6–2.9)
TESTOST FREE SERPL-MCNC: 55 PG/ML (ref 47–244)
TESTOST SERPL-MCNC: 299 NG/DL (ref 300–890)

## 2019-11-13 LAB
IGE SERPL-ACNC: 111 KU/L
T4 FREE SERPL DIALY-MCNC: 1.4 NG/DL (ref 1.1–2.4)

## 2019-11-14 LAB
VIT B1 BLD-MCNC: 139 NMOL/L (ref 70–180)
VIT B6 SERPL-MCNC: 122.7 NMOL/L (ref 20–125)

## 2020-02-14 DIAGNOSIS — G40.909 SEIZURE CEREBRAL (HCC): ICD-10-CM

## 2020-02-14 RX ORDER — OXCARBAZEPINE 300 MG/1
TABLET, FILM COATED ORAL
Qty: 60 TAB | Refills: 0 | Status: SHIPPED | OUTPATIENT
Start: 2020-02-14 | End: 2020-03-16

## 2020-02-14 NOTE — TELEPHONE ENCOUNTER
Received request via: Patient    Was the patient seen in the last year in this department? Yes    Does the patient have an active prescription (recently filled or refills available) for medication(s) requested? No    Last seen 03/27/19  Next appt 03/02/2020

## 2020-03-02 ENCOUNTER — TELEPHONE (OUTPATIENT)
Dept: NEUROLOGY | Facility: MEDICAL CENTER | Age: 45
End: 2020-03-02

## 2020-03-02 ENCOUNTER — OFFICE VISIT (OUTPATIENT)
Dept: NEUROLOGY | Facility: MEDICAL CENTER | Age: 45
End: 2020-03-02
Payer: COMMERCIAL

## 2020-03-02 VITALS
SYSTOLIC BLOOD PRESSURE: 120 MMHG | DIASTOLIC BLOOD PRESSURE: 64 MMHG | OXYGEN SATURATION: 98 % | WEIGHT: 173.72 LBS | HEIGHT: 69 IN | BODY MASS INDEX: 25.73 KG/M2 | HEART RATE: 75 BPM | TEMPERATURE: 98.6 F | RESPIRATION RATE: 16 BRPM

## 2020-03-02 DIAGNOSIS — R42 VERTIGO: ICD-10-CM

## 2020-03-02 DIAGNOSIS — R41.3 MEMORY LOSS, SHORT TERM: ICD-10-CM

## 2020-03-02 DIAGNOSIS — G40.909 SEIZURE CEREBRAL (HCC): ICD-10-CM

## 2020-03-02 PROCEDURE — 99214 OFFICE O/P EST MOD 30 MIN: CPT | Performed by: NURSE PRACTITIONER

## 2020-03-02 ASSESSMENT — ENCOUNTER SYMPTOMS
VOMITING: 0
HEADACHES: 0
NAUSEA: 0
DIARRHEA: 0
MUSCULOSKELETAL NEGATIVE: 1
DOUBLE VISION: 0
ABDOMINAL PAIN: 0
CONSTITUTIONAL NEGATIVE: 1
SORE THROAT: 0
NERVOUS/ANXIOUS: 0
SEIZURES: 0
COUGH: 0

## 2020-03-02 ASSESSMENT — PATIENT HEALTH QUESTIONNAIRE - PHQ9: CLINICAL INTERPRETATION OF PHQ2 SCORE: 0

## 2020-03-02 ASSESSMENT — FIBROSIS 4 INDEX: FIB4 SCORE: 0.67

## 2020-03-02 NOTE — PROGRESS NOTES
Subjective:      Amadeo Fernandez is a 44 y.o. male who presents with Follow-Up (Spell of altered cognition)            HPI  Here today by himself.  His wife has provided a list for him to discuss during our appointment.    6/2019:  Presented to the ER due to a bad migraine and confusion.  It is suspected from     No history of migraines.    Headaches: random and infrequent.  Possibly triggered by stress at work.    Imbalance: when standing still and then turns his head, he will feel off balance.  He has had to fall into the wall.      Short term memory: has to write everything down.  About 5 minutes later he will have forgotten what was discussed.  Long-term has gotten worse.    When driving on the freeway or on a roller coaster he feels horrible and will get a headache.  Fast speeds will make him dizzy and sick.    June 2019 CTA Head normal.    2/2016 Brain IMPRESSION:     1.  Minimal supratentorial white matter disease with a few rare foci of T2 and FLAIR hyperintensity in the subcortical and/or deep white matter consistent with small vessel ischemic change versus demyelination or gliosis.  2.  No evidence of acute cerebral infarction, hemorrhage, mass lesion, hippocampal sclerosis, heterotopic gray matter, cortical dysplasia, or other epileptogenic focus.    Intercurrently has trialed a few different anti-depressant medications and has resumed Zoloft.  He feels the best and most stable emotionally on Zoloft.    Vitamin D3 5000IU per day  Current Outpatient Medications   Medication Sig Dispense Refill   • OXcarbazepine (TRILEPTAL) 300 MG Tab TAKE ONE TABLET BY MOUTH TWICE A DAY 60 Tab 0   • sertraline (ZOLOFT) 100 MG Tab Take 150 mg by mouth every day.     • ondansetron (ZOFRAN ODT) 4 MG TABLET DISPERSIBLE Take 1 Tab by mouth every 8 hours as needed. 10 Tab 0     No current facility-administered medications for this visit.          Review of Systems   Constitutional: Negative.    HENT: Negative for hearing  "loss, nosebleeds and sore throat.         No recent head injury.   Eyes: Negative for double vision.        No new loss of vision.   Respiratory: Negative for cough.         No recent lung infections.   Cardiovascular: Negative for chest pain.   Gastrointestinal: Negative for abdominal pain, diarrhea, nausea and vomiting.   Genitourinary: Negative.    Musculoskeletal: Negative.    Skin: Negative.    Neurological: Positive for dizziness. Negative for seizures and headaches.   Endo/Heme/Allergies:        No history of endocrine dysfunction.  No new problems.   Psychiatric/Behavioral: Positive for depression and memory loss. The patient is not nervous/anxious.         No recent mood changes.          Objective:     /64 (BP Location: Left arm, Patient Position: Sitting, BP Cuff Size: Adult)   Pulse 75   Temp 37 °C (98.6 °F) (Temporal)   Resp 16   Ht 1.753 m (5' 9.02\")   Wt 78.8 kg (173 lb 11.6 oz)   SpO2 98%   BMI 25.64 kg/m²      Physical Exam  Constitutional:       General: He is not in acute distress.     Appearance: He is well-developed and normal weight.   HENT:      Head: Normocephalic and atraumatic.      Nose: Nose normal.   Eyes:      Pupils: Pupils are equal, round, and reactive to light.      Comments: No double vision or loss of vision.   Neck:      Musculoskeletal: Normal range of motion and neck supple.   Cardiovascular:      Rate and Rhythm: Normal rate and regular rhythm.      Heart sounds: Normal heart sounds.      Comments: No recent chest pain.  Pulmonary:      Effort: Pulmonary effort is normal.      Breath sounds: Normal breath sounds.   Abdominal:      Palpations: Abdomen is soft.      Tenderness: There is no abdominal tenderness.   Genitourinary:     Comments: No recent infections.  Musculoskeletal: Normal range of motion.   Lymphadenopathy:      Cervical: No cervical adenopathy.   Skin:     General: Skin is warm and dry.   Neurological:      Mental Status: He is alert and oriented to " person, place, and time.      Cranial Nerves: No cranial nerve deficit.      Motor: No tremor or seizure activity.      Gait: Gait normal.      Deep Tendon Reflexes: Reflexes are normal and symmetric.      Comments: No observable changes in neurologic status.  See initial new patient examination for details.     Psychiatric:         Mood and Affect: Mood normal. Mood is not anxious or depressed.         Speech: Speech normal.         Cognition and Memory: He exhibits impaired recent memory.                 Assessment/Plan:     Seizure Disorder:  Seizure free since November 2015.    Has been taking Oxcarbazepine 300mg qam.    Migraine Headaches:  Complex migraine with vomiting in June 2019.  Triggers include work related stress.    Imbalance and Motion sickness:  Vertigo that is noticed with standing straight and then turns his head.  Intolerant of being on a roller coaster and vertigo worsens when driving on the freeway.    Short-term Memory:  Has to write everything down to remember.    Obtain REEG to evaluate for subclinical seizures.  Obtain Brain MRI with/without contrast to evaluate CNS etiology of migraines and vertigo.    Labs to be obtained as ordered with those ordered per PCP.    Return for follow-up after EEG is performed.        EDUCATION AND COUNSELING:  -Education was provided to the patient and/or family regarding diagnosis and prognosis. The chronic and unpredictable nature of the condition was discussed. There is increased risk for additional events, which may carry potential for significant injuries and death.    -We reviewed the current antiepileptic regimen. Potential side effects of antiepileptics were discussed at length, including but no limited to: hypersensitivity reactions (rash and others, some of which can be fatal), visual field changes (some of which may be irreversible), glaucoma, diplopia, kidney stones, osteopenia/osteoporosis/bone fractures, hyperthermia/anhydrosis, tremors, ataxia,  dizziness, fatigue, increased risk for falls, cardiac arrhythmias/syncope, gastrointestinal (hepatitis, pancreatitis, gastritis, ulcers), gingival hypertrophy, drowsiness, sedation, anxiety/nervousness, increased risk for suicide, increased risk for depression, and psychosis. We reviewed drug-drug interactions and their potential effect on seizure control and medication side effects.    -Patient/family educated on SUDEP (Sudden Death in Epilepsy). Counseling was provided on the importance of strict medication and follow up compliance. The patient understands the risks associated with non-adherence with the medical plan as outlined, including but not limited to an increased risk for breakthrough seizures, which may contribute to injuries, disability, status epilepticus, and even death.    -Counseling was also provided on potential effects of alcohol and other drugs, which may lower seizure threshold and/or affect the metabolism of antiepileptic drugs. I recommend avoidance of alcohol and illegal drugs.  -Recommend proper hydration, regular exercise, proper sleep hygiene (7-8 hrs of overnight sleep, avoid sleep deprivation).    -I have made the patient aware of mandatory reporting required by the law in the State of [place] regarding episodes of seizures, loss of consciousness, and/or alteration of awareness. The patient and family are responsible for reporting events to the DMV, instructions were provided. The patient verbalized understanding and states he has not been driving. Other seizure precautions were discussed at length, including no diving, no skydiving, no unsupervised swimming, no Jacuzzi or bathing in bathtubs.    -He is ok to return to work but cannot operate any heavy machinery, he verbalized understanding.     Pt agrees with plan.

## 2020-03-03 PROBLEM — R42 VERTIGO: Status: ACTIVE | Noted: 2020-03-03

## 2020-03-03 PROBLEM — R41.3 MEMORY LOSS, SHORT TERM: Status: ACTIVE | Noted: 2020-03-03

## 2020-03-03 PROBLEM — G40.909 SEIZURE CEREBRAL (HCC): Status: ACTIVE | Noted: 2020-03-03

## 2020-03-03 ASSESSMENT — ENCOUNTER SYMPTOMS
DIZZINESS: 1
DEPRESSION: 1
MEMORY LOSS: 1

## 2020-03-16 ENCOUNTER — HOSPITAL ENCOUNTER (OUTPATIENT)
Dept: LAB | Facility: MEDICAL CENTER | Age: 45
End: 2020-03-16
Attending: NURSE PRACTITIONER
Payer: COMMERCIAL

## 2020-03-16 ENCOUNTER — TELEPHONE (OUTPATIENT)
Dept: HEALTH INFORMATION MANAGEMENT | Facility: OTHER | Age: 45
End: 2020-03-16

## 2020-03-16 DIAGNOSIS — G40.909 SEIZURE CEREBRAL (HCC): ICD-10-CM

## 2020-03-16 LAB
25(OH)D3 SERPL-MCNC: 32 NG/ML (ref 30–100)
ALBUMIN SERPL BCP-MCNC: 4.6 G/DL (ref 3.2–4.9)
ALBUMIN/GLOB SERPL: 2.1 G/DL
ALP SERPL-CCNC: 51 U/L (ref 30–99)
ALT SERPL-CCNC: 16 U/L (ref 2–50)
AMMONIA PLAS-SCNC: 31 UMOL/L (ref 11–45)
ANION GAP SERPL CALC-SCNC: 7 MMOL/L (ref 7–16)
APPEARANCE UR: CLEAR
AST SERPL-CCNC: 14 U/L (ref 12–45)
BASOPHILS # BLD AUTO: 1.3 % (ref 0–1.8)
BASOPHILS # BLD: 0.08 K/UL (ref 0–0.12)
BILIRUB SERPL-MCNC: 0.5 MG/DL (ref 0.1–1.5)
BILIRUB UR QL STRIP.AUTO: NEGATIVE
BUN SERPL-MCNC: 10 MG/DL (ref 8–22)
CALCIUM SERPL-MCNC: 9.6 MG/DL (ref 8.5–10.5)
CHLORIDE SERPL-SCNC: 107 MMOL/L (ref 96–112)
CHOLEST SERPL-MCNC: 186 MG/DL (ref 100–199)
CO2 SERPL-SCNC: 27 MMOL/L (ref 20–33)
COLOR UR: YELLOW
CREAT SERPL-MCNC: 0.73 MG/DL (ref 0.5–1.4)
EOSINOPHIL # BLD AUTO: 0.24 K/UL (ref 0–0.51)
EOSINOPHIL NFR BLD: 3.9 % (ref 0–6.9)
ERYTHROCYTE [DISTWIDTH] IN BLOOD BY AUTOMATED COUNT: 46.6 FL (ref 35.9–50)
FOLATE SERPL-MCNC: 7.1 NG/ML
FSH SERPL-ACNC: 6.3 MIU/ML (ref 1.5–12.4)
GLOBULIN SER CALC-MCNC: 2.2 G/DL (ref 1.9–3.5)
GLUCOSE SERPL-MCNC: 98 MG/DL (ref 65–99)
GLUCOSE UR STRIP.AUTO-MCNC: NEGATIVE MG/DL
HCT VFR BLD AUTO: 46 % (ref 42–52)
HDLC SERPL-MCNC: 58 MG/DL
HGB BLD-MCNC: 15.7 G/DL (ref 14–18)
IMM GRANULOCYTES # BLD AUTO: 0.03 K/UL (ref 0–0.11)
IMM GRANULOCYTES NFR BLD AUTO: 0.5 % (ref 0–0.9)
KETONES UR STRIP.AUTO-MCNC: NEGATIVE MG/DL
LDLC SERPL CALC-MCNC: 107 MG/DL
LEUKOCYTE ESTERASE UR QL STRIP.AUTO: NEGATIVE
LH SERPL-ACNC: 2 IU/L (ref 1.7–8.6)
LYMPHOCYTES # BLD AUTO: 1.95 K/UL (ref 1–4.8)
LYMPHOCYTES NFR BLD: 31.9 % (ref 22–41)
MCH RBC QN AUTO: 32.6 PG (ref 27–33)
MCHC RBC AUTO-ENTMCNC: 34.1 G/DL (ref 33.7–35.3)
MCV RBC AUTO: 95.4 FL (ref 81.4–97.8)
MICRO URNS: NORMAL
MONOCYTES # BLD AUTO: 0.44 K/UL (ref 0–0.85)
MONOCYTES NFR BLD AUTO: 7.2 % (ref 0–13.4)
NEUTROPHILS # BLD AUTO: 3.37 K/UL (ref 1.82–7.42)
NEUTROPHILS NFR BLD: 55.2 % (ref 44–72)
NITRITE UR QL STRIP.AUTO: NEGATIVE
NRBC # BLD AUTO: 0 K/UL
NRBC BLD-RTO: 0 /100 WBC
PH UR STRIP.AUTO: 7 [PH] (ref 5–8)
PLATELET # BLD AUTO: 238 K/UL (ref 164–446)
PMV BLD AUTO: 10.5 FL (ref 9–12.9)
POTASSIUM SERPL-SCNC: 4.3 MMOL/L (ref 3.6–5.5)
PROLACTIN SERPL-MCNC: 6.35 NG/ML (ref 2.1–17.7)
PROT SERPL-MCNC: 6.8 G/DL (ref 6–8.2)
PROT UR QL STRIP: NEGATIVE MG/DL
PSA SERPL-MCNC: 0.52 NG/ML (ref 0–4)
RBC # BLD AUTO: 4.82 M/UL (ref 4.7–6.1)
RBC UR QL AUTO: NEGATIVE
SODIUM SERPL-SCNC: 141 MMOL/L (ref 135–145)
SP GR UR STRIP.AUTO: 1.01
T3FREE SERPL-MCNC: 3.21 PG/ML (ref 2.4–4.2)
THYROPEROXIDASE AB SERPL-ACNC: <0.2 IU/ML (ref 0–9)
TRIGL SERPL-MCNC: 107 MG/DL (ref 0–149)
TSH SERPL DL<=0.005 MIU/L-ACNC: 1.09 UIU/ML (ref 0.38–5.33)
UROBILINOGEN UR STRIP.AUTO-MCNC: 0.2 MG/DL
VIT B12 SERPL-MCNC: 577 PG/ML (ref 211–911)
WBC # BLD AUTO: 6.1 K/UL (ref 4.8–10.8)

## 2020-03-16 PROCEDURE — 83002 ASSAY OF GONADOTROPIN (LH): CPT

## 2020-03-16 PROCEDURE — 84439 ASSAY OF FREE THYROXINE: CPT

## 2020-03-16 PROCEDURE — 83001 ASSAY OF GONADOTROPIN (FSH): CPT

## 2020-03-16 PROCEDURE — 84146 ASSAY OF PROLACTIN: CPT

## 2020-03-16 PROCEDURE — 84425 ASSAY OF VITAMIN B-1: CPT

## 2020-03-16 PROCEDURE — 36415 COLL VENOUS BLD VENIPUNCTURE: CPT

## 2020-03-16 PROCEDURE — 82785 ASSAY OF IGE: CPT

## 2020-03-16 PROCEDURE — 80061 LIPID PANEL: CPT

## 2020-03-16 PROCEDURE — 85025 COMPLETE CBC W/AUTO DIFF WBC: CPT

## 2020-03-16 PROCEDURE — 82784 ASSAY IGA/IGD/IGG/IGM EACH: CPT

## 2020-03-16 PROCEDURE — 84207 ASSAY OF VITAMIN B-6: CPT

## 2020-03-16 PROCEDURE — 80183 DRUG SCRN QUANT OXCARBAZEPIN: CPT

## 2020-03-16 PROCEDURE — 82140 ASSAY OF AMMONIA: CPT

## 2020-03-16 PROCEDURE — 84402 ASSAY OF FREE TESTOSTERONE: CPT

## 2020-03-16 PROCEDURE — 82607 VITAMIN B-12: CPT

## 2020-03-16 PROCEDURE — 84270 ASSAY OF SEX HORMONE GLOBUL: CPT

## 2020-03-16 PROCEDURE — 84481 FREE ASSAY (FT-3): CPT

## 2020-03-16 PROCEDURE — 80053 COMPREHEN METABOLIC PANEL: CPT

## 2020-03-16 PROCEDURE — 82306 VITAMIN D 25 HYDROXY: CPT

## 2020-03-16 PROCEDURE — 84443 ASSAY THYROID STIM HORMONE: CPT

## 2020-03-16 PROCEDURE — 84153 ASSAY OF PSA TOTAL: CPT

## 2020-03-16 PROCEDURE — 83036 HEMOGLOBIN GLYCOSYLATED A1C: CPT

## 2020-03-16 PROCEDURE — 84403 ASSAY OF TOTAL TESTOSTERONE: CPT

## 2020-03-16 PROCEDURE — 82746 ASSAY OF FOLIC ACID SERUM: CPT

## 2020-03-16 PROCEDURE — 81003 URINALYSIS AUTO W/O SCOPE: CPT

## 2020-03-16 PROCEDURE — 86376 MICROSOMAL ANTIBODY EACH: CPT

## 2020-03-16 NOTE — TELEPHONE ENCOUNTER
1. Caller Name: Amadeo Fernandez                     Call Back Number: [phone]  [facility] PCP or Specialty Provider: No            2.  Does patient have any active symptoms of respiratory illness (fever OR cough OR shortness of breath)? Yes, the patient reports the following respiratory symptoms: cough, shortness of breath and hx of pnx every yearx 6 days.    3.  Does patient have any comoribidities? None     4.  In the last 30 days, has the patient traveled outside of the country OR in a high risk area within the [place] OR have any known contact with someone who has or is suspected to have COVID-19?  No.    5. Disposition: Advised to go to [facility]    Note routed to PCP: MELINDA only.

## 2020-03-17 LAB
EST. AVERAGE GLUCOSE BLD GHB EST-MCNC: 120 MG/DL
HBA1C MFR BLD: 5.8 % (ref 0–5.6)

## 2020-03-18 LAB
10OH-CARBAZEPINE SERPL-MCNC: 5 UG/ML (ref 3–35)
IGA SERPL-MCNC: 173 MG/DL (ref 68–408)
IGE SERPL-ACNC: 92 KU/L
IGG SERPL-MCNC: 532 MG/DL (ref 768–1632)
IGM SERPL-MCNC: 37 MG/DL (ref 35–263)
SHBG SERPL-SCNC: 28 NMOL/L (ref 11–80)
TESTOST FREE MFR SERPL: 1.9 % (ref 1.6–2.9)
TESTOST FREE SERPL-MCNC: 44 PG/ML (ref 47–244)
TESTOST SERPL-MCNC: 230 NG/DL (ref 300–890)
VIT B6 SERPL-MCNC: 87 NMOL/L (ref 20–125)

## 2020-03-19 LAB — VIT B1 BLD-MCNC: 137 NMOL/L (ref 70–180)

## 2020-03-20 LAB — T4 FREE SERPL DIALY-MCNC: 1.3 NG/DL (ref 1.1–2.4)

## 2020-05-14 ENCOUNTER — TELEPHONE (OUTPATIENT)
Dept: NEUROLOGY | Facility: MEDICAL CENTER | Age: 45
End: 2020-05-14

## 2020-05-14 NOTE — TELEPHONE ENCOUNTER
Patient called and is asking to schedule his MRI and EEG. Called him back and explained how to schedule. Transferred patient to specialty scheduling.

## 2020-05-29 ENCOUNTER — HOSPITAL ENCOUNTER (OUTPATIENT)
Dept: RADIOLOGY | Facility: MEDICAL CENTER | Age: 45
End: 2020-05-29
Attending: NURSE PRACTITIONER
Payer: COMMERCIAL

## 2020-05-29 ENCOUNTER — APPOINTMENT (OUTPATIENT)
Dept: NEUROLOGY | Facility: MEDICAL CENTER | Age: 45
End: 2020-05-29
Payer: COMMERCIAL

## 2020-05-29 DIAGNOSIS — R42 VERTIGO: ICD-10-CM

## 2020-05-29 DIAGNOSIS — G40.909 SEIZURE CEREBRAL (HCC): ICD-10-CM

## 2020-05-29 PROCEDURE — 700117 HCHG RX CONTRAST REV CODE 255: Performed by: NURSE PRACTITIONER

## 2020-05-29 PROCEDURE — 70553 MRI BRAIN STEM W/O & W/DYE: CPT

## 2020-05-29 PROCEDURE — A9576 INJ PROHANCE MULTIPACK: HCPCS | Performed by: NURSE PRACTITIONER

## 2020-05-29 RX ADMIN — GADOTERIDOL 15 ML: 279.3 INJECTION, SOLUTION INTRAVENOUS at 13:14

## 2020-06-02 ENCOUNTER — TELEPHONE (OUTPATIENT)
Dept: NEUROLOGY | Facility: MEDICAL CENTER | Age: 45
End: 2020-06-02

## 2020-06-02 NOTE — TELEPHONE ENCOUNTER
Please let Childs know I have received his recent Brain MRI results.    MILD SUPRATENTORIAL WHITE MATTER DISEASE. NONSPECIFIC FINDINGS CONSISTENT WITH MICROVASCULAR ISCHEMIC CHANGE VERSUS DEMYELINATION OR GLIOSIS. SIMILAR APPEARANCE TO PRIOR EXAM, ALTHOUGH LESIONS ARE MORE CONSPICUOUS ON THE HIGHER-RESOLUTION 3.0 3.0   EZEKIEL EXAM OBTAINED TODAY.    It would be recommended to do a repeat comparison in 3-5 years from now.

## 2020-06-22 ENCOUNTER — TELEPHONE (OUTPATIENT)
Dept: NEUROLOGY | Facility: MEDICAL CENTER | Age: 45
End: 2020-06-22

## 2020-07-01 ENCOUNTER — NON-PROVIDER VISIT (OUTPATIENT)
Dept: NEUROLOGY | Facility: MEDICAL CENTER | Age: 45
End: 2020-07-01
Payer: COMMERCIAL

## 2020-07-01 DIAGNOSIS — G40.909 SEIZURE CEREBRAL (HCC): ICD-10-CM

## 2020-07-01 PROCEDURE — 95816 EEG AWAKE AND DROWSY: CPT | Performed by: PSYCHIATRY & NEUROLOGY

## 2020-07-01 NOTE — PROCEDURES
ROUTINE ELECTROENCEPHALOGRAM REPORT      Referring provider: NAVYA Mckenna.     DOS: 7/1/2020 (total recording of 24 minutes)    INDICATION:  Amadeo Fernandez 44 y.o. male presenting with h/o seizures, headache, confusion.     CURRENT ANTIEPILEPTIC REGIMEN: Oxcarbazepine.     TECHNIQUE: 30 channel routine electroencephalogram (EEG) was performed in accordance with the international 10-20 system. The study was reviewed in bipolar and referential montages. The recording examined the patient during wakeful and drowsy state(s).     DESCRIPTION OF THE RECORD:  During the wakefulness, the background showed a symmetrical 10 Hz alpha activity posteriorly with amplitude of 70 mV.  There was reactivity to eye closure/opening.  A normal anterior-posterior gradient was noted with faster beta frequencies seen anteriorly.  During drowsiness, theta/delta frequencies were seen.    ACTIVATION PROCEDURES:   Hyperventilation was performed by the patient for a total of 3 minutes. The technician performing the test noted good effort. This technique produced electroencephalographic changes in keeping with the expected bilaterally synchronous, frontally predominant, high amplitude slow waves build up.     Intermittent Photic stimulation was performed in a stepwise fashion from 1 to 30 Hz and elicited a normal response (photic driving), most noticeable in the posterior leads.      ICTAL AND/OR INTERICTAL FINDINGS:   No focal or generalized epileptiform activity noted. No regional slowing was seen during this routine study.  No clinical events or seizures were reported or recorded during the study.     EKG: sampling of the EKG recording demonstrated sinus rhythm.       INTERPRETATION:  This is a normal routine EEG recording in the awake and drowsy state(s).  Clinical correlation is recommended.    Note: A normal EEG does not rule out epilepsy.  If the clinical suspicion remains high for seizures, a prolonged recording to capture  clinical or subclinical events may be helpful.        Pineda Alvarado MD   Epilepsy and Neurodiagnostics.   Clinical  of Neurology [facility].   Diplomate in Neurology, Epilepsy, and Electrodiagnostic Medicine.   Office: [phone]  Fax: [phone]

## 2020-08-25 ENCOUNTER — APPOINTMENT (RX ONLY)
Dept: URBAN - METROPOLITAN AREA CLINIC 4 | Facility: CLINIC | Age: 45
Setting detail: DERMATOLOGY
End: 2020-08-25

## 2020-08-25 DIAGNOSIS — L81.3 CAFÉ AU LAIT SPOTS: ICD-10-CM

## 2020-08-25 DIAGNOSIS — L57.8 OTHER SKIN CHANGES DUE TO CHRONIC EXPOSURE TO NONIONIZING RADIATION: ICD-10-CM

## 2020-08-25 DIAGNOSIS — F42.4 EXCORIATION (SKIN-PICKING) DISORDER: ICD-10-CM

## 2020-08-25 DIAGNOSIS — L81.4 OTHER MELANIN HYPERPIGMENTATION: ICD-10-CM

## 2020-08-25 DIAGNOSIS — L82.1 OTHER SEBORRHEIC KERATOSIS: ICD-10-CM

## 2020-08-25 DIAGNOSIS — D18.0 HEMANGIOMA: ICD-10-CM

## 2020-08-25 DIAGNOSIS — Z71.89 OTHER SPECIFIED COUNSELING: ICD-10-CM

## 2020-08-25 DIAGNOSIS — D22 MELANOCYTIC NEVI: ICD-10-CM

## 2020-08-25 PROBLEM — D22.39 MELANOCYTIC NEVI OF OTHER PARTS OF FACE: Status: ACTIVE | Noted: 2020-08-25

## 2020-08-25 PROBLEM — D22.61 MELANOCYTIC NEVI OF RIGHT UPPER LIMB, INCLUDING SHOULDER: Status: ACTIVE | Noted: 2020-08-25

## 2020-08-25 PROBLEM — D22.71 MELANOCYTIC NEVI OF RIGHT LOWER LIMB, INCLUDING HIP: Status: ACTIVE | Noted: 2020-08-25

## 2020-08-25 PROBLEM — D22.72 MELANOCYTIC NEVI OF LEFT LOWER LIMB, INCLUDING HIP: Status: ACTIVE | Noted: 2020-08-25

## 2020-08-25 PROBLEM — S50.911A UNSPECIFIED SUPERFICIAL INJURY OF RIGHT FOREARM, INITIAL ENCOUNTER: Status: ACTIVE | Noted: 2020-08-25

## 2020-08-25 PROBLEM — D22.62 MELANOCYTIC NEVI OF LEFT UPPER LIMB, INCLUDING SHOULDER: Status: ACTIVE | Noted: 2020-08-25

## 2020-08-25 PROBLEM — D22.5 MELANOCYTIC NEVI OF TRUNK: Status: ACTIVE | Noted: 2020-08-25

## 2020-08-25 PROBLEM — D18.01 HEMANGIOMA OF SKIN AND SUBCUTANEOUS TISSUE: Status: ACTIVE | Noted: 2020-08-25

## 2020-08-25 PROCEDURE — ? ADDITIONAL NOTES

## 2020-08-25 PROCEDURE — 99214 OFFICE O/P EST MOD 30 MIN: CPT

## 2020-08-25 PROCEDURE — ? COUNSELING

## 2020-08-25 ASSESSMENT — LOCATION DETAILED DESCRIPTION DERM
LOCATION DETAILED: SUPERIOR THORACIC SPINE
LOCATION DETAILED: RIGHT MEDIAL UPPER BACK
LOCATION DETAILED: RIGHT MEDIAL INFERIOR CHEST
LOCATION DETAILED: INFERIOR THORACIC SPINE
LOCATION DETAILED: RIGHT DISTAL POSTERIOR THIGH
LOCATION DETAILED: RIGHT PROXIMAL DORSAL FOREARM
LOCATION DETAILED: LOWER STERNUM
LOCATION DETAILED: RIGHT DISTAL POSTERIOR UPPER ARM
LOCATION DETAILED: MID POSTERIOR NECK
LOCATION DETAILED: POSTERIOR MID-PARIETAL SCALP
LOCATION DETAILED: LEFT PROXIMAL POSTERIOR UPPER ARM
LOCATION DETAILED: EPIGASTRIC SKIN
LOCATION DETAILED: LEFT SUPERIOR MEDIAL UPPER BACK
LOCATION DETAILED: LEFT PROXIMAL POSTERIOR THIGH
LOCATION DETAILED: LEFT INFERIOR CENTRAL MALAR CHEEK
LOCATION DETAILED: MIDDLE STERNUM
LOCATION DETAILED: RIGHT PROXIMAL POSTERIOR THIGH
LOCATION DETAILED: RIGHT PROXIMAL RADIAL DORSAL FOREARM
LOCATION DETAILED: LEFT INFERIOR MEDIAL FOREHEAD
LOCATION DETAILED: RIGHT PROXIMAL POSTERIOR UPPER ARM
LOCATION DETAILED: LEFT MEDIAL UPPER BACK
LOCATION DETAILED: LEFT DISTAL POSTERIOR UPPER ARM
LOCATION DETAILED: RIGHT ANTERIOR DISTAL UPPER ARM
LOCATION DETAILED: RIGHT INFERIOR UPPER BACK
LOCATION DETAILED: LEFT ANTERIOR DISTAL UPPER ARM
LOCATION DETAILED: LEFT DISTAL POSTERIOR THIGH
LOCATION DETAILED: LEFT SUPERIOR LATERAL BUCCAL CHEEK
LOCATION DETAILED: RIGHT INFERIOR MEDIAL UPPER BACK
LOCATION DETAILED: LEFT PROXIMAL DORSAL FOREARM
LOCATION DETAILED: RIGHT SUPERIOR LATERAL MIDBACK

## 2020-08-25 ASSESSMENT — LOCATION SIMPLE DESCRIPTION DERM
LOCATION SIMPLE: LEFT POSTERIOR THIGH
LOCATION SIMPLE: RIGHT UPPER BACK
LOCATION SIMPLE: LEFT FOREARM
LOCATION SIMPLE: RIGHT UPPER ARM
LOCATION SIMPLE: LEFT FOREHEAD
LOCATION SIMPLE: CHEST
LOCATION SIMPLE: RIGHT LOWER BACK
LOCATION SIMPLE: LEFT UPPER BACK
LOCATION SIMPLE: RIGHT POSTERIOR THIGH
LOCATION SIMPLE: POSTERIOR NECK
LOCATION SIMPLE: ABDOMEN
LOCATION SIMPLE: LEFT UPPER ARM
LOCATION SIMPLE: LEFT CHEEK
LOCATION SIMPLE: RIGHT FOREARM
LOCATION SIMPLE: UPPER BACK
LOCATION SIMPLE: POSTERIOR SCALP

## 2020-08-25 ASSESSMENT — LOCATION ZONE DERM
LOCATION ZONE: NECK
LOCATION ZONE: FACE
LOCATION ZONE: LEG
LOCATION ZONE: ARM
LOCATION ZONE: TRUNK
LOCATION ZONE: SCALP

## 2020-08-25 NOTE — PROCEDURE: MIPS QUALITY
Quality 226: Preventive Care And Screening: Tobacco Use: Screening And Cessation Intervention: Tobacco Screening not Performed for Medical Reasons
Detail Level: Generalized
Quality 402: Tobacco Use And Help With Quitting Among Adolescents: Patient screened for tobacco and never smoked

## 2020-09-22 ENCOUNTER — HOSPITAL ENCOUNTER (OUTPATIENT)
Dept: LAB | Facility: MEDICAL CENTER | Age: 45
End: 2020-09-22
Attending: NURSE PRACTITIONER
Payer: COMMERCIAL

## 2020-09-22 LAB
ALBUMIN SERPL BCP-MCNC: 4.6 G/DL (ref 3.2–4.9)
ALBUMIN/GLOB SERPL: 2.3 G/DL
ALP SERPL-CCNC: 55 U/L (ref 30–99)
ALT SERPL-CCNC: 18 U/L (ref 2–50)
ANION GAP SERPL CALC-SCNC: 9 MMOL/L (ref 7–16)
AST SERPL-CCNC: 12 U/L (ref 12–45)
BASOPHILS # BLD AUTO: 1.3 % (ref 0–1.8)
BASOPHILS # BLD: 0.07 K/UL (ref 0–0.12)
BILIRUB SERPL-MCNC: 0.2 MG/DL (ref 0.1–1.5)
BUN SERPL-MCNC: 11 MG/DL (ref 8–22)
CALCIUM SERPL-MCNC: 9.4 MG/DL (ref 8.5–10.5)
CHLORIDE SERPL-SCNC: 103 MMOL/L (ref 96–112)
CHOLEST SERPL-MCNC: 182 MG/DL (ref 100–199)
CO2 SERPL-SCNC: 25 MMOL/L (ref 20–33)
CREAT SERPL-MCNC: 0.73 MG/DL (ref 0.5–1.4)
EOSINOPHIL # BLD AUTO: 0.3 K/UL (ref 0–0.51)
EOSINOPHIL NFR BLD: 5.4 % (ref 0–6.9)
ERYTHROCYTE [DISTWIDTH] IN BLOOD BY AUTOMATED COUNT: 49.1 FL (ref 35.9–50)
EST. AVERAGE GLUCOSE BLD GHB EST-MCNC: 123 MG/DL
FASTING STATUS PATIENT QL REPORTED: NORMAL
GLOBULIN SER CALC-MCNC: 2 G/DL (ref 1.9–3.5)
GLUCOSE SERPL-MCNC: 117 MG/DL (ref 65–99)
HBA1C MFR BLD: 5.9 % (ref 0–5.6)
HCT VFR BLD AUTO: 47.8 % (ref 42–52)
HDLC SERPL-MCNC: 63 MG/DL
HGB BLD-MCNC: 16.2 G/DL (ref 14–18)
IMM GRANULOCYTES # BLD AUTO: 0.02 K/UL (ref 0–0.11)
IMM GRANULOCYTES NFR BLD AUTO: 0.4 % (ref 0–0.9)
LDLC SERPL CALC-MCNC: 106 MG/DL
LYMPHOCYTES # BLD AUTO: 1.81 K/UL (ref 1–4.8)
LYMPHOCYTES NFR BLD: 32.7 % (ref 22–41)
MCH RBC QN AUTO: 32.9 PG (ref 27–33)
MCHC RBC AUTO-ENTMCNC: 33.9 G/DL (ref 33.7–35.3)
MCV RBC AUTO: 97.2 FL (ref 81.4–97.8)
MONOCYTES # BLD AUTO: 0.47 K/UL (ref 0–0.85)
MONOCYTES NFR BLD AUTO: 8.5 % (ref 0–13.4)
NEUTROPHILS # BLD AUTO: 2.87 K/UL (ref 1.82–7.42)
NEUTROPHILS NFR BLD: 51.7 % (ref 44–72)
NRBC # BLD AUTO: 0 K/UL
NRBC BLD-RTO: 0 /100 WBC
PLATELET # BLD AUTO: 270 K/UL (ref 164–446)
PMV BLD AUTO: 10.4 FL (ref 9–12.9)
POTASSIUM SERPL-SCNC: 4.4 MMOL/L (ref 3.6–5.5)
PROT SERPL-MCNC: 6.6 G/DL (ref 6–8.2)
PSA SERPL-MCNC: 0.57 NG/ML (ref 0–4)
RBC # BLD AUTO: 4.92 M/UL (ref 4.7–6.1)
SODIUM SERPL-SCNC: 137 MMOL/L (ref 135–145)
TRIGL SERPL-MCNC: 65 MG/DL (ref 0–149)
WBC # BLD AUTO: 5.5 K/UL (ref 4.8–10.8)

## 2020-09-22 PROCEDURE — 85025 COMPLETE CBC W/AUTO DIFF WBC: CPT

## 2020-09-22 PROCEDURE — 84402 ASSAY OF FREE TESTOSTERONE: CPT

## 2020-09-22 PROCEDURE — 84153 ASSAY OF PSA TOTAL: CPT

## 2020-09-22 PROCEDURE — 83036 HEMOGLOBIN GLYCOSYLATED A1C: CPT

## 2020-09-22 PROCEDURE — 36415 COLL VENOUS BLD VENIPUNCTURE: CPT

## 2020-09-22 PROCEDURE — 84270 ASSAY OF SEX HORMONE GLOBUL: CPT

## 2020-09-22 PROCEDURE — 80053 COMPREHEN METABOLIC PANEL: CPT

## 2020-09-22 PROCEDURE — 84403 ASSAY OF TOTAL TESTOSTERONE: CPT

## 2020-09-22 PROCEDURE — 80061 LIPID PANEL: CPT

## 2020-09-24 LAB
SHBG SERPL-SCNC: 33 NMOL/L (ref 11–80)
TESTOST FREE MFR SERPL: 1.8 % (ref 1.6–2.9)
TESTOST FREE SERPL-MCNC: 65 PG/ML (ref 47–244)
TESTOST SERPL-MCNC: 354 NG/DL (ref 300–890)

## 2021-03-29 ENCOUNTER — OFFICE VISIT (OUTPATIENT)
Dept: NEUROLOGY | Facility: MEDICAL CENTER | Age: 46
End: 2021-03-29
Attending: NURSE PRACTITIONER
Payer: COMMERCIAL

## 2021-03-29 VITALS
OXYGEN SATURATION: 96 % | HEART RATE: 85 BPM | RESPIRATION RATE: 16 BRPM | BODY MASS INDEX: 26.61 KG/M2 | WEIGHT: 179.68 LBS | HEIGHT: 69 IN | DIASTOLIC BLOOD PRESSURE: 76 MMHG | SYSTOLIC BLOOD PRESSURE: 128 MMHG | TEMPERATURE: 98.8 F

## 2021-03-29 DIAGNOSIS — G40.909 SEIZURE CEREBRAL (HCC): ICD-10-CM

## 2021-03-29 DIAGNOSIS — F41.9 ANXIETY: ICD-10-CM

## 2021-03-29 DIAGNOSIS — I49.3 PVC (PREMATURE VENTRICULAR CONTRACTION): ICD-10-CM

## 2021-03-29 PROCEDURE — 99211 OFF/OP EST MAY X REQ PHY/QHP: CPT | Performed by: NURSE PRACTITIONER

## 2021-03-29 PROCEDURE — 99214 OFFICE O/P EST MOD 30 MIN: CPT | Performed by: NURSE PRACTITIONER

## 2021-03-29 RX ORDER — OXCARBAZEPINE 300 MG/1
TABLET, FILM COATED ORAL
Qty: 60 TABLET | Refills: 5 | Status: SHIPPED | OUTPATIENT
Start: 2021-03-29

## 2021-03-29 ASSESSMENT — ENCOUNTER SYMPTOMS
SEIZURES: 0
NAUSEA: 0
MUSCULOSKELETAL NEGATIVE: 1
DOUBLE VISION: 0
HEADACHES: 0
NERVOUS/ANXIOUS: 0
CONSTITUTIONAL NEGATIVE: 1
VOMITING: 0
DEPRESSION: 1
SORE THROAT: 0
DIARRHEA: 0
ABDOMINAL PAIN: 0
COUGH: 0

## 2021-03-29 ASSESSMENT — FIBROSIS 4 INDEX: FIB4 SCORE: 0.45

## 2021-03-29 ASSESSMENT — PATIENT HEALTH QUESTIONNAIRE - PHQ9: CLINICAL INTERPRETATION OF PHQ2 SCORE: 0

## 2021-03-29 NOTE — PROGRESS NOTES
Subjective:      Amadeo Fernandez is a 45 y.o. male who presents with Follow-Up (Seizure cerebral)            HPI Last known seizure was in .    History of unusual events including dizziness and altered consciousness with headaches.  Last unusual event occurred 2019 in which he presented to the ED for migraine and confusion.    No longer with concern for short term memory impairment.    Denies any episodes of LOC or alteration of consciousness, loss of bodily control, unusual feelings otherwise noted below.    Of concern, he has been prone to anxiety since childhood.  But he also has been noticing changes with his heart beat such as when laying on the couch he may feel his heart skip a beat or beat in a strong way.  He does not recall having these cardiac changes with typical anxiety when he was younger.    Father and PGF  of MI in the setting of alcohol usage.    2019 CTA Head normal.     2016 Brain IMPRESSION:     1.  Minimal supratentorial white matter disease with a few rare foci of T2 and FLAIR hyperintensity in the subcortical and/or deep white matter consistent with small vessel ischemic change versus demyelination or gliosis.  2.  No evidence of acute cerebral infarction, hemorrhage, mass lesion, hippocampal sclerosis, heterotopic gray matter, cortical dysplasia, or other epileptogenic focus    INTERPRETATION:  This is a normal routine EEG recording in the awake and drowsy state(s).  Clinical correlation is recommended.     Note: A normal EEG does not rule out epilepsy.  If the clinical suspicion remains high for seizures, a prolonged recording to capture clinical or subclinical events may be helpful.     Needs paperwork for his employer completed today.     Vitamin D 5000iU per day    Current Outpatient Medications   Medication Sig Dispense Refill   • OXcarbazepine (TRILEPTAL) 300 MG Tab TAKE ONE TABLET BY MOUTH TWICE A DAY 60 Tab 2   • sertraline (ZOLOFT) 100 MG Tab Take 150 mg by  "mouth every day.       No current facility-administered medications for this visit.       Review of Systems   Constitutional: Negative.    HENT: Negative for hearing loss, nosebleeds and sore throat.         No recent head injury.   Eyes: Negative for double vision.        No new loss of vision.   Respiratory: Negative for cough.         No recent lung infections.   Cardiovascular: Negative for chest pain.   Gastrointestinal: Negative for abdominal pain, diarrhea, nausea and vomiting.   Genitourinary: Negative.    Musculoskeletal: Negative.    Skin: Negative.    Neurological: Negative for seizures and headaches.   Endo/Heme/Allergies:        No history of endocrine dysfunction.  No new problems.   Psychiatric/Behavioral: Positive for depression. The patient is not nervous/anxious.         No recent mood changes.          Objective:     /76 (BP Location: Left arm, Patient Position: Sitting, BP Cuff Size: Adult)   Pulse 85   Temp 37.1 °C (98.8 °F) (Temporal)   Resp 16   Ht 1.753 m (5' 9.02\")   Wt 81.5 kg (179 lb 10.8 oz)   SpO2 96%   BMI 26.52 kg/m²      Physical Exam  Constitutional:       General: He is not in acute distress.  HENT:      Head: Normocephalic and atraumatic.      Mouth/Throat:      Mouth: Mucous membranes are moist.   Eyes:      Pupils: Pupils are equal, round, and reactive to light.   Cardiovascular:      Rate and Rhythm: Normal rate and regular rhythm.   Pulmonary:      Effort: Pulmonary effort is normal. No respiratory distress.      Breath sounds: Normal breath sounds.   Musculoskeletal:         General: Normal range of motion.      Cervical back: Normal range of motion.   Skin:     General: Skin is warm and dry.   Neurological:      Mental Status: He is alert and oriented to person, place, and time.      Cranial Nerves: No cranial nerve deficit.      Motor: No abnormal muscle tone.      Coordination: Coordination normal.      Gait: Gait normal.      Deep Tendon Reflexes: Reflexes are " normal and symmetric.      Comments: No observable changes in neurologic status.  See initial new patient examination for details.     Psychiatric:         Mood and Affect: Mood normal.             Assessment/Plan:        Seizure Disorder:  Seizure free since November 2015.     Has been taking Oxcarbazepine 300mg BID for many years.     Migraine Headaches:  Complex migraine with vomiting in June 2019.  Triggers include work related stress but are rarely occuring.     Imbalance and Motion sickness:  No longer a concern.     Short-term Memory:  No longer a concern.     Occasional anxiety and irregular heart rhythm discussed-- may be related to sertraline.  Referral placed to cardiology as PCP has not pursued.    Paperwork for employer completed and will be faxed.    Return for follow-up in 6 months or sooner if needed.         EDUCATION AND COUNSELING:  -Education was provided to the patient and/or family regarding diagnosis and prognosis. The chronic and unpredictable nature of the condition was discussed. There is increased risk for additional events, which may carry potential for significant injuries and death.    -We reviewed the current antiepileptic regimen. Potential side effects of antiepileptics were discussed at length, including but no limited to: hypersensitivity reactions (rash and others, some of which can be fatal), visual field changes (some of which may be irreversible), glaucoma, diplopia, kidney stones, osteopenia/osteoporosis/bone fractures, hyperthermia/anhydrosis, tremors, ataxia, dizziness, fatigue, increased risk for falls, cardiac arrhythmias/syncope, gastrointestinal (hepatitis, pancreatitis, gastritis, ulcers), gingival hypertrophy, drowsiness, sedation, anxiety/nervousness, increased risk for suicide, increased risk for depression, and psychosis. We reviewed drug-drug interactions and their potential effect on seizure control and medication side effects.    -Patient/family educated on SUDEP  (Sudden Death in Epilepsy). Counseling was provided on the importance of strict medication and follow up compliance. The patient understands the risks associated with non-adherence with the medical plan as outlined, including but not limited to an increased risk for breakthrough seizures, which may contribute to injuries, disability, status epilepticus, and even death.    -Counseling was also provided on potential effects of alcohol and other drugs, which may lower seizure threshold and/or affect the metabolism of antiepileptic drugs. I recommend avoidance of alcohol and illegal drugs.  -Recommend proper hydration, regular exercise, proper sleep hygiene (7-8 hrs of overnight sleep, avoid sleep deprivation).    -I have made the patient aware of mandatory reporting required by the law in the State of [place] regarding episodes of seizures, loss of consciousness, and/or alteration of awareness. The patient and family are responsible for reporting events to the DMV, instructions were provided. The patient verbalized understanding and states he has not been driving. Other seizure precautions were discussed at length, including no diving, no skydiving, no unsupervised swimming, no Jacuzzi or bathing in bathtubs.    -He is ok to return to work but cannot operate any heavy machinery, he verbalized understanding.      Pt agrees with plan.

## 2021-05-19 ENCOUNTER — HOSPITAL ENCOUNTER (OUTPATIENT)
Dept: LAB | Facility: MEDICAL CENTER | Age: 46
End: 2021-05-19
Attending: NURSE PRACTITIONER
Payer: COMMERCIAL

## 2021-05-19 LAB
ALBUMIN SERPL BCP-MCNC: 4.5 G/DL (ref 3.2–4.9)
ALBUMIN/GLOB SERPL: 2 G/DL
ALP SERPL-CCNC: 55 U/L (ref 30–99)
ALT SERPL-CCNC: 25 U/L (ref 2–50)
ANION GAP SERPL CALC-SCNC: 10 MMOL/L (ref 7–16)
AST SERPL-CCNC: 12 U/L (ref 12–45)
BASOPHILS # BLD AUTO: 1.1 % (ref 0–1.8)
BASOPHILS # BLD: 0.07 K/UL (ref 0–0.12)
BILIRUB SERPL-MCNC: 0.8 MG/DL (ref 0.1–1.5)
BUN SERPL-MCNC: 13 MG/DL (ref 8–22)
CALCIUM SERPL-MCNC: 9.4 MG/DL (ref 8.5–10.5)
CHLORIDE SERPL-SCNC: 105 MMOL/L (ref 96–112)
CO2 SERPL-SCNC: 25 MMOL/L (ref 20–33)
CREAT SERPL-MCNC: 0.73 MG/DL (ref 0.5–1.4)
EOSINOPHIL # BLD AUTO: 0.42 K/UL (ref 0–0.51)
EOSINOPHIL NFR BLD: 6.7 % (ref 0–6.9)
ERYTHROCYTE [DISTWIDTH] IN BLOOD BY AUTOMATED COUNT: 47.4 FL (ref 35.9–50)
EST. AVERAGE GLUCOSE BLD GHB EST-MCNC: 114 MG/DL
FASTING STATUS PATIENT QL REPORTED: NORMAL
GLOBULIN SER CALC-MCNC: 2.2 G/DL (ref 1.9–3.5)
GLUCOSE SERPL-MCNC: 108 MG/DL (ref 65–99)
HBA1C MFR BLD: 5.6 % (ref 4–5.6)
HCT VFR BLD AUTO: 46.5 % (ref 42–52)
HGB BLD-MCNC: 16.1 G/DL (ref 14–18)
IMM GRANULOCYTES # BLD AUTO: 0.02 K/UL (ref 0–0.11)
IMM GRANULOCYTES NFR BLD AUTO: 0.3 % (ref 0–0.9)
LYMPHOCYTES # BLD AUTO: 2.19 K/UL (ref 1–4.8)
LYMPHOCYTES NFR BLD: 35.2 % (ref 22–41)
MCH RBC QN AUTO: 32.9 PG (ref 27–33)
MCHC RBC AUTO-ENTMCNC: 34.6 G/DL (ref 33.7–35.3)
MCV RBC AUTO: 95.1 FL (ref 81.4–97.8)
MONOCYTES # BLD AUTO: 0.58 K/UL (ref 0–0.85)
MONOCYTES NFR BLD AUTO: 9.3 % (ref 0–13.4)
NEUTROPHILS # BLD AUTO: 2.95 K/UL (ref 1.82–7.42)
NEUTROPHILS NFR BLD: 47.4 % (ref 44–72)
NRBC # BLD AUTO: 0 K/UL
NRBC BLD-RTO: 0 /100 WBC
PLATELET # BLD AUTO: 246 K/UL (ref 164–446)
PMV BLD AUTO: 10.4 FL (ref 9–12.9)
POTASSIUM SERPL-SCNC: 4 MMOL/L (ref 3.6–5.5)
PROT SERPL-MCNC: 6.7 G/DL (ref 6–8.2)
RBC # BLD AUTO: 4.89 M/UL (ref 4.7–6.1)
SODIUM SERPL-SCNC: 140 MMOL/L (ref 135–145)
T3 SERPL-MCNC: 95.1 NG/DL (ref 60–181)
TSH SERPL DL<=0.005 MIU/L-ACNC: 1.44 UIU/ML (ref 0.38–5.33)
WBC # BLD AUTO: 6.2 K/UL (ref 4.8–10.8)

## 2021-05-19 PROCEDURE — 36415 COLL VENOUS BLD VENIPUNCTURE: CPT

## 2021-05-19 PROCEDURE — 80061 LIPID PANEL: CPT

## 2021-05-19 PROCEDURE — 80053 COMPREHEN METABOLIC PANEL: CPT

## 2021-05-19 PROCEDURE — 85025 COMPLETE CBC W/AUTO DIFF WBC: CPT

## 2021-05-19 PROCEDURE — 84443 ASSAY THYROID STIM HORMONE: CPT

## 2021-05-19 PROCEDURE — 83704 LIPOPROTEIN BLD QUAN PART: CPT

## 2021-05-19 PROCEDURE — 82652 VIT D 1 25-DIHYDROXY: CPT

## 2021-05-19 PROCEDURE — 84480 ASSAY TRIIODOTHYRONINE (T3): CPT

## 2021-05-19 PROCEDURE — 83036 HEMOGLOBIN GLYCOSYLATED A1C: CPT

## 2021-05-19 PROCEDURE — 84439 ASSAY OF FREE THYROXINE: CPT

## 2021-05-21 LAB — 1,25(OH)2D3 SERPL-MCNC: 78.3 PG/ML (ref 19.9–79.3)

## 2021-05-23 LAB
CHOLEST SERPL-MCNC: 198 MG/DL
FASTING STATUS PATIENT QL REPORTED: NORMAL
HDL PARTICAL NO Q4363: 34.6 UMOL/L
HDL SIZE Q4361: 9 NM
HDLC SERPL-MCNC: 55 MG/DL (ref 40–59)
HLD.LARGE SERPL-SCNC: 6 UMOL/L
L VLDL PART NO Q4357: 3.7 NMOL/L
LDL SERPL QN: 21 NM
LDL SERPL-SCNC: 1351 NMOL/L
LDL SMALL SERPL-SCNC: 496 NMOL/L
LDLC SERPL CALC-MCNC: 126 MG/DL
PATHOLOGY STUDY: ABNORMAL
TRIGL SERPL-MCNC: 86 MG/DL (ref 30–149)
VLDL SIZE Q4362: 47.8 NM

## 2021-05-24 LAB — T4 FREE SERPL DIALY-MCNC: 1.4 NG/DL (ref 1.1–2.4)

## 2021-06-28 ENCOUNTER — HOSPITAL ENCOUNTER (OUTPATIENT)
Dept: RADIOLOGY | Facility: MEDICAL CENTER | Age: 46
End: 2021-06-28
Attending: NURSE PRACTITIONER
Payer: COMMERCIAL

## 2021-06-28 DIAGNOSIS — E78.2 MIXED HYPERLIPIDEMIA: ICD-10-CM

## 2021-06-28 PROCEDURE — 4410556 CT-CARDIAC SCORING (SELF PAY ONLY)

## 2021-09-30 NOTE — PROGRESS NOTES
Follow-up visit    Patient: Amadeo Fernandez  : 1975    Referring Physician: No ref. provider found   Daniel Man M.D.    CC: seizures    IMPRESSION:    1. Seizure type and frequency of seizures-   Seizure type and semiology:   Seizure frequency  Last  seizure ***   2. Etiology/Syndrome- ***  3. EEG findings- ***  4. Brain imaging ***  5. Antiepileptic drug side effects and counseling done     6. Surgery consideration vns done   7. Safety issues counseling done   8. Reproductive, contraception, pregnancy discussion done, advised compliance  Association with pseudo-seizures cannot be ruled-out       PLAN:  There are no diagnoses linked to this encounter.  1. Management options were discussed with patient.   2. advise to reduce triggering factors, continue to follow up with psychologist and psychiatrist.   3. Surveillance labs: Vitamin D 25 hydroxy level, CBC and CMP   4. Woman within reproductive age: side effect of AEDs was discussed, 2 mg Folate is prescribed.  5. No medication change is indicated at this time.   6. Seizure precautions were discussed in detail with patient: limit driving unless seizure free for at least 3 months based on state law.   Other precaution includes no working at heights, with heavy machinery or sharp objects, cooking on open fire, swimming without a , or taking a bath in tub.   7. Return in 3 months or sooner if needed.   8. 24 hours ambulatory EEG for risk stratification      HISTORY:    I had the opportunity to see Mr. Amadeo Fernandez in the epilepsy clinic on 10/4/2021 for follow up on seizure disorder. he was accompanied by*** who provided additional history he was last seen by Annamaria in 3/2021. Also was seen by DR. Cheng in the past    Dominant hand: right handed     In brief, his pertinent medical history is remarkable for different types of events.      Event type 1: episode of falling and shaking,  and 2015 for brief seconds  Event type 2: limbs  tingling and urine incontinence? 2018  Event type 3: migraine with confusion (no history of migraine prior ) 2019, triggered by stress?    Long standing memory concern, report intermittently.     Special features:  They were noted only during the wake, there was no specific timing.     Potential triggering factors were reviewed   Sleep deprivation   Alcohol   Stress   Other    Epilepsy risk factors:     -Positive: concussions   -Negative: Normal prenatal and  course. Normal development physically and intellectually. No febrile convulsions. No history of severe head trauma. No meningitis. No stroke. No alcohol abuse. No significant exposure to recreational drugs. There is no family history of epilepsy, or spells.       Current AEDs:  /0    Previous workup:    1. EEG data: normal EEGs by Dr. Cheng    2. Neuroimaging data: MRI brain 2020 3T mild white matter chagne     3. Recent AED level:    Prior antiepileptic medications were reviewed  Levetiracetam (Keppra), Oxcarbazepine (Trileptal)  and Zonisamide (Zonegran)      I reviewed the previous medical history, surgical, family and social histories in the electronic medical record.   On review of systems, 10 of 14 systems are reviewed and found to be negative except as listed above.     Current Outpatient Medications   Medication Sig Dispense Refill   • OXcarbazepine (TRILEPTAL) 300 MG Tab TAKE ONE TABLET BY MOUTH TWICE A DAY 60 tablet 5   • sertraline (ZOLOFT) 100 MG Tab Take 150 mg by mouth every day.       No current facility-administered medications for this visit.        No Known Allergies    Past Medical History:   Diagnosis Date   • Anxiety    • Depression    • Seizure disorder (HCC)        Social History     Socioeconomic History   • Marital status:      Spouse name: Not on file   • Number of children: Not on file   • Years of education: Not on file   • Highest education level: Not on file   Occupational History   • Not on file   Tobacco  Use   • Smoking status: Never Smoker   • Smokeless tobacco: Current User     Types: Chew   Substance and Sexual Activity   • Alcohol use: Yes     Comment: occass   • Drug use: Yes     Types: Inhaled     Comment: marijuana   • Sexual activity: Yes   Other Topics Concern   • Not on file   Social History Narrative   • Not on file     Social Determinants of Health     Financial Resource Strain:    • Difficulty of Paying Living Expenses:    Food Insecurity:    • Worried About Running Out of Food in the Last Year:    • Ran Out of Food in the Last Year:    Transportation Needs:    • Lack of Transportation (Medical):    • Lack of Transportation (Non-Medical):    Physical Activity:    • Days of Exercise per Week:    • Minutes of Exercise per Session:    Stress:    • Feeling of Stress :    Social Connections:    • Frequency of Communication with Friends and Family:    • Frequency of Social Gatherings with Friends and Family:    • Attends Spiritism Services:    • Active Member of Clubs or Organizations:    • Attends Club or Organization Meetings:    • Marital Status:    Intimate Partner Violence:    • Fear of Current or Ex-Partner:    • Emotionally Abused:    • Physically Abused:    • Sexually Abused:        No family history on file.      PHYSICAL EXAM:      There were no vitals taken for this visit.    On examination,  He appears his stated age. He has a normal, reactive affect.No apparent distress,  alert and appropriate. No labored breathing.   There is no peripheral edema. Skin: no rash or abnormalities     He gives a precise account of  his clinical symptoms. He has fluent conversational speech, without error. No dysarthria.     EOMI without nystagmus, PERRLA, VFF, facial movements intact. No UE drift. I see no tremor.    Gait is normal    [unfilled]      The total visit duration was of *** minutes of which more than 50% was spent in coordination of care and counseling.         Kai Saucedo MD  Diplomate, American Board  of Psychiatry and Neurology   Diplomate, American Board of Psychiatry and Neurology in Epilepsy

## 2021-10-04 ENCOUNTER — APPOINTMENT (OUTPATIENT)
Dept: NEUROLOGY | Facility: MEDICAL CENTER | Age: 46
End: 2021-10-04
Payer: COMMERCIAL

## 2021-11-29 ENCOUNTER — HOSPITAL ENCOUNTER (OUTPATIENT)
Dept: LAB | Facility: MEDICAL CENTER | Age: 46
End: 2021-11-29
Attending: NURSE PRACTITIONER
Payer: COMMERCIAL

## 2021-11-29 LAB
ALBUMIN SERPL BCP-MCNC: 4.7 G/DL (ref 3.2–4.9)
ALBUMIN/GLOB SERPL: 2.1 G/DL
ALP SERPL-CCNC: 71 U/L (ref 30–99)
ALT SERPL-CCNC: 21 U/L (ref 2–50)
ANION GAP SERPL CALC-SCNC: 10 MMOL/L (ref 7–16)
APPEARANCE UR: CLEAR
AST SERPL-CCNC: 14 U/L (ref 12–45)
BASOPHILS # BLD AUTO: 1.3 % (ref 0–1.8)
BASOPHILS # BLD: 0.09 K/UL (ref 0–0.12)
BILIRUB SERPL-MCNC: 0.2 MG/DL (ref 0.1–1.5)
BILIRUB UR QL STRIP.AUTO: NEGATIVE
BUN SERPL-MCNC: 13 MG/DL (ref 8–22)
CALCIUM SERPL-MCNC: 9.6 MG/DL (ref 8.5–10.5)
CHLORIDE SERPL-SCNC: 104 MMOL/L (ref 96–112)
CO2 SERPL-SCNC: 26 MMOL/L (ref 20–33)
COLOR UR: YELLOW
CREAT SERPL-MCNC: 0.83 MG/DL (ref 0.5–1.4)
CREAT UR-MCNC: 230.57 MG/DL
EOSINOPHIL # BLD AUTO: 0.33 K/UL (ref 0–0.51)
EOSINOPHIL NFR BLD: 4.8 % (ref 0–6.9)
ERYTHROCYTE [DISTWIDTH] IN BLOOD BY AUTOMATED COUNT: 48.8 FL (ref 35.9–50)
EST. AVERAGE GLUCOSE BLD GHB EST-MCNC: 123 MG/DL
GLOBULIN SER CALC-MCNC: 2.2 G/DL (ref 1.9–3.5)
GLUCOSE SERPL-MCNC: 114 MG/DL (ref 65–99)
GLUCOSE UR STRIP.AUTO-MCNC: NEGATIVE MG/DL
HBA1C MFR BLD: 5.9 % (ref 4–5.6)
HCT VFR BLD AUTO: 50.3 % (ref 42–52)
HGB BLD-MCNC: 16.9 G/DL (ref 14–18)
IMM GRANULOCYTES # BLD AUTO: 0.02 K/UL (ref 0–0.11)
IMM GRANULOCYTES NFR BLD AUTO: 0.3 % (ref 0–0.9)
KETONES UR STRIP.AUTO-MCNC: NEGATIVE MG/DL
LEUKOCYTE ESTERASE UR QL STRIP.AUTO: NEGATIVE
LYMPHOCYTES # BLD AUTO: 2.28 K/UL (ref 1–4.8)
LYMPHOCYTES NFR BLD: 33 % (ref 22–41)
MCH RBC QN AUTO: 32.1 PG (ref 27–33)
MCHC RBC AUTO-ENTMCNC: 33.6 G/DL (ref 33.7–35.3)
MCV RBC AUTO: 95.4 FL (ref 81.4–97.8)
MICRO URNS: NORMAL
MICROALBUMIN UR-MCNC: <1.2 MG/DL
MICROALBUMIN/CREAT UR: NORMAL MG/G (ref 0–30)
MONOCYTES # BLD AUTO: 0.59 K/UL (ref 0–0.85)
MONOCYTES NFR BLD AUTO: 8.5 % (ref 0–13.4)
NEUTROPHILS # BLD AUTO: 3.6 K/UL (ref 1.82–7.42)
NEUTROPHILS NFR BLD: 52.1 % (ref 44–72)
NITRITE UR QL STRIP.AUTO: NEGATIVE
NRBC # BLD AUTO: 0 K/UL
NRBC BLD-RTO: 0 /100 WBC
PH UR STRIP.AUTO: 6.5 [PH] (ref 5–8)
PLATELET # BLD AUTO: 282 K/UL (ref 164–446)
PMV BLD AUTO: 10.1 FL (ref 9–12.9)
POTASSIUM SERPL-SCNC: 4.5 MMOL/L (ref 3.6–5.5)
PROT SERPL-MCNC: 6.9 G/DL (ref 6–8.2)
PROT UR QL STRIP: NEGATIVE MG/DL
PSA SERPL-MCNC: 0.68 NG/ML (ref 0–4)
RBC # BLD AUTO: 5.27 M/UL (ref 4.7–6.1)
RBC UR QL AUTO: NEGATIVE
SODIUM SERPL-SCNC: 140 MMOL/L (ref 135–145)
SP GR UR STRIP.AUTO: 1.02
T3FREE SERPL-MCNC: 3.03 PG/ML (ref 2–4.4)
TSH SERPL DL<=0.005 MIU/L-ACNC: 1.97 UIU/ML (ref 0.38–5.33)
UROBILINOGEN UR STRIP.AUTO-MCNC: 0.2 MG/DL
WBC # BLD AUTO: 6.9 K/UL (ref 4.8–10.8)

## 2021-11-29 PROCEDURE — 36415 COLL VENOUS BLD VENIPUNCTURE: CPT

## 2021-11-29 PROCEDURE — 84439 ASSAY OF FREE THYROXINE: CPT

## 2021-11-29 PROCEDURE — 85025 COMPLETE CBC W/AUTO DIFF WBC: CPT

## 2021-11-29 PROCEDURE — 84481 FREE ASSAY (FT-3): CPT

## 2021-11-29 PROCEDURE — 82306 VITAMIN D 25 HYDROXY: CPT

## 2021-11-29 PROCEDURE — 84443 ASSAY THYROID STIM HORMONE: CPT

## 2021-11-29 PROCEDURE — 82652 VIT D 1 25-DIHYDROXY: CPT

## 2021-11-29 PROCEDURE — 80053 COMPREHEN METABOLIC PANEL: CPT

## 2021-11-29 PROCEDURE — 82570 ASSAY OF URINE CREATININE: CPT

## 2021-11-29 PROCEDURE — 81003 URINALYSIS AUTO W/O SCOPE: CPT

## 2021-11-29 PROCEDURE — 82043 UR ALBUMIN QUANTITATIVE: CPT

## 2021-11-29 PROCEDURE — 83036 HEMOGLOBIN GLYCOSYLATED A1C: CPT

## 2021-11-29 PROCEDURE — 84153 ASSAY OF PSA TOTAL: CPT

## 2021-11-30 LAB — 25(OH)D3 SERPL-MCNC: 29 NG/ML (ref 30–80)

## 2021-12-01 LAB — 1,25(OH)2D3 SERPL-MCNC: 63 PG/ML (ref 19.9–79.3)

## 2021-12-03 LAB — T4 FREE SERPL DIALY-MCNC: 1 NG/DL (ref 1.1–2.4)

## 2022-05-31 ENCOUNTER — HOSPITAL ENCOUNTER (OUTPATIENT)
Dept: LAB | Facility: MEDICAL CENTER | Age: 47
End: 2022-05-31
Attending: NURSE PRACTITIONER
Payer: COMMERCIAL

## 2022-05-31 LAB
25(OH)D3 SERPL-MCNC: 31 NG/ML (ref 30–100)
ALBUMIN SERPL BCP-MCNC: 4.5 G/DL (ref 3.2–4.9)
ALBUMIN/GLOB SERPL: 2 G/DL
ALP SERPL-CCNC: 67 U/L (ref 30–99)
ALT SERPL-CCNC: 16 U/L (ref 2–50)
ANION GAP SERPL CALC-SCNC: 8 MMOL/L (ref 7–16)
AST SERPL-CCNC: 12 U/L (ref 12–45)
BILIRUB SERPL-MCNC: 0.2 MG/DL (ref 0.1–1.5)
BUN SERPL-MCNC: 9 MG/DL (ref 8–22)
CALCIUM SERPL-MCNC: 9.4 MG/DL (ref 8.5–10.5)
CHLORIDE SERPL-SCNC: 106 MMOL/L (ref 96–112)
CO2 SERPL-SCNC: 27 MMOL/L (ref 20–33)
CREAT SERPL-MCNC: 0.78 MG/DL (ref 0.5–1.4)
EST. AVERAGE GLUCOSE BLD GHB EST-MCNC: 111 MG/DL
FASTING STATUS PATIENT QL REPORTED: NORMAL
FOLATE SERPL-MCNC: 3.3 NG/ML
GFR SERPLBLD CREATININE-BSD FMLA CKD-EPI: 112 ML/MIN/1.73 M 2
GLOBULIN SER CALC-MCNC: 2.3 G/DL (ref 1.9–3.5)
GLUCOSE SERPL-MCNC: 109 MG/DL (ref 65–99)
HBA1C MFR BLD: 5.5 % (ref 4–5.6)
POTASSIUM SERPL-SCNC: 4.5 MMOL/L (ref 3.6–5.5)
PROT SERPL-MCNC: 6.8 G/DL (ref 6–8.2)
SODIUM SERPL-SCNC: 141 MMOL/L (ref 135–145)
VIT B12 SERPL-MCNC: 777 PG/ML (ref 211–911)

## 2022-05-31 PROCEDURE — 83704 LIPOPROTEIN BLD QUAN PART: CPT

## 2022-05-31 PROCEDURE — 83036 HEMOGLOBIN GLYCOSYLATED A1C: CPT

## 2022-05-31 PROCEDURE — 82607 VITAMIN B-12: CPT

## 2022-05-31 PROCEDURE — 82746 ASSAY OF FOLIC ACID SERUM: CPT

## 2022-05-31 PROCEDURE — 82306 VITAMIN D 25 HYDROXY: CPT

## 2022-05-31 PROCEDURE — 80061 LIPID PANEL: CPT

## 2022-05-31 PROCEDURE — 36415 COLL VENOUS BLD VENIPUNCTURE: CPT

## 2022-05-31 PROCEDURE — 84402 ASSAY OF FREE TESTOSTERONE: CPT

## 2022-05-31 PROCEDURE — 84403 ASSAY OF TOTAL TESTOSTERONE: CPT

## 2022-05-31 PROCEDURE — 84270 ASSAY OF SEX HORMONE GLOBUL: CPT

## 2022-05-31 PROCEDURE — 80053 COMPREHEN METABOLIC PANEL: CPT

## 2022-06-02 LAB
CHOLEST SERPL-MCNC: 210 MG/DL
HDL PARTICAL NO Q4363: 39.5 UMOL/L
HDL SIZE Q4361: 8.9 NM
HDLC SERPL-MCNC: 63 MG/DL (ref 40–59)
HLD.LARGE SERPL-SCNC: 6.3 UMOL/L
L VLDL PART NO Q4357: 4.1 NMOL/L
LDL SERPL QN: 20.9 NM
LDL SERPL-SCNC: 1449 NMOL/L
LDL SMALL SERPL-SCNC: 519 NMOL/L
LDLC SERPL CALC-MCNC: 126 MG/DL
PATHOLOGY STUDY: ABNORMAL
SHBG SERPL-SCNC: 35 NMOL/L (ref 17–56)
TESTOST FREE MFR SERPL: 1.7 % (ref 1.6–2.9)
TESTOST FREE SERPL-MCNC: 47 PG/ML (ref 47–244)
TESTOST SERPL-MCNC: 274 NG/DL (ref 300–890)
TRIGL SERPL-MCNC: 103 MG/DL (ref 30–149)
VLDL SIZE Q4362: 48.8 NM

## 2022-12-12 ENCOUNTER — HOSPITAL ENCOUNTER (OUTPATIENT)
Dept: LAB | Facility: MEDICAL CENTER | Age: 47
End: 2022-12-12
Attending: NURSE PRACTITIONER
Payer: COMMERCIAL

## 2022-12-12 LAB
25(OH)D3 SERPL-MCNC: 39 NG/ML (ref 30–100)
ALBUMIN SERPL BCP-MCNC: 4.9 G/DL (ref 3.2–4.9)
ALBUMIN/GLOB SERPL: 2.1 G/DL
ALP SERPL-CCNC: 65 U/L (ref 30–99)
ALT SERPL-CCNC: 19 U/L (ref 2–50)
ANION GAP SERPL CALC-SCNC: 13 MMOL/L (ref 7–16)
APPEARANCE UR: CLEAR
AST SERPL-CCNC: 18 U/L (ref 12–45)
BASOPHILS # BLD AUTO: 1.4 % (ref 0–1.8)
BASOPHILS # BLD: 0.07 K/UL (ref 0–0.12)
BILIRUB SERPL-MCNC: 0.3 MG/DL (ref 0.1–1.5)
BILIRUB UR QL STRIP.AUTO: NEGATIVE
BUN SERPL-MCNC: 13 MG/DL (ref 8–22)
CALCIUM SERPL-MCNC: 9.5 MG/DL (ref 8.5–10.5)
CHLORIDE SERPL-SCNC: 104 MMOL/L (ref 96–112)
CHOLEST SERPL-MCNC: 227 MG/DL (ref 100–199)
CO2 SERPL-SCNC: 22 MMOL/L (ref 20–33)
COLOR UR: YELLOW
CREAT SERPL-MCNC: 0.88 MG/DL (ref 0.5–1.4)
CREAT UR-MCNC: 159.45 MG/DL
EOSINOPHIL # BLD AUTO: 0.34 K/UL (ref 0–0.51)
EOSINOPHIL NFR BLD: 7 % (ref 0–6.9)
ERYTHROCYTE [DISTWIDTH] IN BLOOD BY AUTOMATED COUNT: 48.5 FL (ref 35.9–50)
EST. AVERAGE GLUCOSE BLD GHB EST-MCNC: 114 MG/DL
FASTING STATUS PATIENT QL REPORTED: NORMAL
FOLATE SERPL-MCNC: 5.8 NG/ML
GFR SERPLBLD CREATININE-BSD FMLA CKD-EPI: 108 ML/MIN/1.73 M 2
GLOBULIN SER CALC-MCNC: 2.3 G/DL (ref 1.9–3.5)
GLUCOSE SERPL-MCNC: 114 MG/DL (ref 65–99)
GLUCOSE UR STRIP.AUTO-MCNC: NEGATIVE MG/DL
HBA1C MFR BLD: 5.6 % (ref 4–5.6)
HCT VFR BLD AUTO: 49.7 % (ref 42–52)
HDLC SERPL-MCNC: 68 MG/DL
HGB BLD-MCNC: 16.8 G/DL (ref 14–18)
IMM GRANULOCYTES # BLD AUTO: 0.02 K/UL (ref 0–0.11)
IMM GRANULOCYTES NFR BLD AUTO: 0.4 % (ref 0–0.9)
KETONES UR STRIP.AUTO-MCNC: NEGATIVE MG/DL
LDLC SERPL CALC-MCNC: 146 MG/DL
LEUKOCYTE ESTERASE UR QL STRIP.AUTO: NEGATIVE
LYMPHOCYTES # BLD AUTO: 1.87 K/UL (ref 1–4.8)
LYMPHOCYTES NFR BLD: 38.4 % (ref 22–41)
MCH RBC QN AUTO: 32.4 PG (ref 27–33)
MCHC RBC AUTO-ENTMCNC: 33.8 G/DL (ref 33.7–35.3)
MCV RBC AUTO: 95.9 FL (ref 81.4–97.8)
MICRO URNS: NORMAL
MICROALBUMIN UR-MCNC: <1.2 MG/DL
MICROALBUMIN/CREAT UR: NORMAL MG/G (ref 0–30)
MONOCYTES # BLD AUTO: 0.44 K/UL (ref 0–0.85)
MONOCYTES NFR BLD AUTO: 9 % (ref 0–13.4)
NEUTROPHILS # BLD AUTO: 2.13 K/UL (ref 1.82–7.42)
NEUTROPHILS NFR BLD: 43.8 % (ref 44–72)
NITRITE UR QL STRIP.AUTO: NEGATIVE
NRBC # BLD AUTO: 0 K/UL
NRBC BLD-RTO: 0 /100 WBC
PH UR STRIP.AUTO: 6 [PH] (ref 5–8)
PLATELET # BLD AUTO: 283 K/UL (ref 164–446)
PMV BLD AUTO: 10.2 FL (ref 9–12.9)
POTASSIUM SERPL-SCNC: 4.3 MMOL/L (ref 3.6–5.5)
PROT SERPL-MCNC: 7.2 G/DL (ref 6–8.2)
PROT UR QL STRIP: NEGATIVE MG/DL
PSA SERPL-MCNC: 0.64 NG/ML (ref 0–4)
RBC # BLD AUTO: 5.18 M/UL (ref 4.7–6.1)
RBC UR QL AUTO: NEGATIVE
SODIUM SERPL-SCNC: 139 MMOL/L (ref 135–145)
SP GR UR STRIP.AUTO: 1.02
T3FREE SERPL-MCNC: 2.98 PG/ML (ref 2–4.4)
TESTOST SERPL-MCNC: 277 NG/DL (ref 175–781)
TRIGL SERPL-MCNC: 64 MG/DL (ref 0–149)
TSH SERPL DL<=0.005 MIU/L-ACNC: 1.57 UIU/ML (ref 0.38–5.33)
UROBILINOGEN UR STRIP.AUTO-MCNC: 0.2 MG/DL
VIT B12 SERPL-MCNC: 870 PG/ML (ref 211–911)
WBC # BLD AUTO: 4.9 K/UL (ref 4.8–10.8)

## 2022-12-12 PROCEDURE — 85025 COMPLETE CBC W/AUTO DIFF WBC: CPT

## 2022-12-12 PROCEDURE — 36415 COLL VENOUS BLD VENIPUNCTURE: CPT

## 2022-12-12 PROCEDURE — 82746 ASSAY OF FOLIC ACID SERUM: CPT

## 2022-12-12 PROCEDURE — 82043 UR ALBUMIN QUANTITATIVE: CPT

## 2022-12-12 PROCEDURE — 82570 ASSAY OF URINE CREATININE: CPT

## 2022-12-12 PROCEDURE — 84439 ASSAY OF FREE THYROXINE: CPT

## 2022-12-12 PROCEDURE — 80061 LIPID PANEL: CPT

## 2022-12-12 PROCEDURE — 82306 VITAMIN D 25 HYDROXY: CPT

## 2022-12-12 PROCEDURE — 84153 ASSAY OF PSA TOTAL: CPT

## 2022-12-12 PROCEDURE — 84481 FREE ASSAY (FT-3): CPT

## 2022-12-12 PROCEDURE — 80053 COMPREHEN METABOLIC PANEL: CPT

## 2022-12-12 PROCEDURE — 83036 HEMOGLOBIN GLYCOSYLATED A1C: CPT

## 2022-12-12 PROCEDURE — 84403 ASSAY OF TOTAL TESTOSTERONE: CPT

## 2022-12-12 PROCEDURE — 84270 ASSAY OF SEX HORMONE GLOBUL: CPT

## 2022-12-12 PROCEDURE — 81003 URINALYSIS AUTO W/O SCOPE: CPT

## 2022-12-12 PROCEDURE — 84443 ASSAY THYROID STIM HORMONE: CPT

## 2022-12-12 PROCEDURE — 82607 VITAMIN B-12: CPT

## 2022-12-12 PROCEDURE — 84425 ASSAY OF VITAMIN B-1: CPT

## 2022-12-12 PROCEDURE — 84402 ASSAY OF FREE TESTOSTERONE: CPT

## 2022-12-14 LAB
SHBG SERPL-SCNC: 34 NMOL/L (ref 17–56)
TESTOST FREE MFR SERPL: 1.8 % (ref 1.6–2.9)
TESTOST FREE SERPL-MCNC: 53 PG/ML (ref 47–244)
TESTOST SERPL-MCNC: 301 NG/DL (ref 300–890)

## 2022-12-16 LAB — T4 FREE SERPL DIALY-MCNC: 1.2 NG/DL (ref 1.1–2.4)

## 2022-12-17 LAB — VIT B1 BLD-MCNC: 139 NMOL/L (ref 70–180)

## 2023-03-10 ENCOUNTER — HOSPITAL ENCOUNTER (OUTPATIENT)
Dept: LAB | Facility: MEDICAL CENTER | Age: 48
End: 2023-03-10
Attending: NURSE PRACTITIONER
Payer: COMMERCIAL

## 2023-03-10 LAB
25(OH)D3 SERPL-MCNC: 45 NG/ML (ref 30–100)
ALBUMIN SERPL BCP-MCNC: 4.7 G/DL (ref 3.2–4.9)
ALBUMIN/GLOB SERPL: 1.8 G/DL
ALP SERPL-CCNC: 63 U/L (ref 30–99)
ALT SERPL-CCNC: 20 U/L (ref 2–50)
ANION GAP SERPL CALC-SCNC: 12 MMOL/L (ref 7–16)
AST SERPL-CCNC: 16 U/L (ref 12–45)
BILIRUB SERPL-MCNC: 0.4 MG/DL (ref 0.1–1.5)
BUN SERPL-MCNC: 13 MG/DL (ref 8–22)
CALCIUM ALBUM COR SERPL-MCNC: 8.9 MG/DL (ref 8.5–10.5)
CALCIUM SERPL-MCNC: 9.5 MG/DL (ref 8.5–10.5)
CHLORIDE SERPL-SCNC: 106 MMOL/L (ref 96–112)
CHOLEST SERPL-MCNC: 149 MG/DL (ref 100–199)
CO2 SERPL-SCNC: 23 MMOL/L (ref 20–33)
CREAT SERPL-MCNC: 0.81 MG/DL (ref 0.5–1.4)
CREAT UR-MCNC: 120.44 MG/DL
FASTING STATUS PATIENT QL REPORTED: NORMAL
GFR SERPLBLD CREATININE-BSD FMLA CKD-EPI: 111 ML/MIN/1.73 M 2
GLOBULIN SER CALC-MCNC: 2.6 G/DL (ref 1.9–3.5)
GLUCOSE SERPL-MCNC: 103 MG/DL (ref 65–99)
HDLC SERPL-MCNC: 56 MG/DL
LDLC SERPL CALC-MCNC: 84 MG/DL
MICROALBUMIN UR-MCNC: <1.2 MG/DL
MICROALBUMIN/CREAT UR: NORMAL MG/G (ref 0–30)
POTASSIUM SERPL-SCNC: 4.2 MMOL/L (ref 3.6–5.5)
PROT SERPL-MCNC: 7.3 G/DL (ref 6–8.2)
PSA SERPL-MCNC: 0.6 NG/ML (ref 0–4)
SODIUM SERPL-SCNC: 141 MMOL/L (ref 135–145)
TRIGL SERPL-MCNC: 43 MG/DL (ref 0–149)

## 2023-03-10 PROCEDURE — 82306 VITAMIN D 25 HYDROXY: CPT

## 2023-03-10 PROCEDURE — 80061 LIPID PANEL: CPT

## 2023-03-10 PROCEDURE — 80053 COMPREHEN METABOLIC PANEL: CPT

## 2023-03-10 PROCEDURE — 82043 UR ALBUMIN QUANTITATIVE: CPT

## 2023-03-10 PROCEDURE — 84260 ASSAY OF SEROTONIN: CPT

## 2023-03-10 PROCEDURE — 82570 ASSAY OF URINE CREATININE: CPT

## 2023-03-10 PROCEDURE — 36415 COLL VENOUS BLD VENIPUNCTURE: CPT

## 2023-03-10 PROCEDURE — 84153 ASSAY OF PSA TOTAL: CPT

## 2023-03-14 LAB — SEROTONIN SER-MCNC: <10 NG/ML (ref 50–220)

## 2023-04-07 ENCOUNTER — TELEPHONE (OUTPATIENT)
Dept: HEALTH INFORMATION MANAGEMENT | Facility: OTHER | Age: 48
End: 2023-04-07
Payer: COMMERCIAL

## 2023-06-30 ASSESSMENT — ENCOUNTER SYMPTOMS
CHEST TIGHTNESS: 0
SHORTNESS OF BREATH: 0
DYSPNEA AT REST: 0
HEMOPTYSIS: 0
RESPIRATORY SYMPTOMS COMMENTS: NO
WHEEZING: 1

## 2023-07-03 ENCOUNTER — OFFICE VISIT (OUTPATIENT)
Dept: SLEEP MEDICINE | Facility: MEDICAL CENTER | Age: 48
End: 2023-07-03
Attending: STUDENT IN AN ORGANIZED HEALTH CARE EDUCATION/TRAINING PROGRAM
Payer: COMMERCIAL

## 2023-07-03 VITALS
HEIGHT: 69 IN | DIASTOLIC BLOOD PRESSURE: 76 MMHG | HEART RATE: 61 BPM | BODY MASS INDEX: 26.96 KG/M2 | RESPIRATION RATE: 16 BRPM | WEIGHT: 182 LBS | SYSTOLIC BLOOD PRESSURE: 122 MMHG | OXYGEN SATURATION: 98 %

## 2023-07-03 DIAGNOSIS — J45.20 MILD INTERMITTENT ASTHMA WITHOUT COMPLICATION: ICD-10-CM

## 2023-07-03 PROCEDURE — 99204 OFFICE O/P NEW MOD 45 MIN: CPT | Performed by: STUDENT IN AN ORGANIZED HEALTH CARE EDUCATION/TRAINING PROGRAM

## 2023-07-03 PROCEDURE — 3074F SYST BP LT 130 MM HG: CPT | Performed by: STUDENT IN AN ORGANIZED HEALTH CARE EDUCATION/TRAINING PROGRAM

## 2023-07-03 PROCEDURE — 99211 OFF/OP EST MAY X REQ PHY/QHP: CPT | Performed by: STUDENT IN AN ORGANIZED HEALTH CARE EDUCATION/TRAINING PROGRAM

## 2023-07-03 PROCEDURE — 3078F DIAST BP <80 MM HG: CPT | Performed by: STUDENT IN AN ORGANIZED HEALTH CARE EDUCATION/TRAINING PROGRAM

## 2023-07-03 RX ORDER — FLUTICASONE FUROATE AND VILANTEROL 100; 25 UG/1; UG/1
1 POWDER RESPIRATORY (INHALATION) DAILY
Qty: 60 EACH | Refills: 3 | Status: SHIPPED
Start: 2023-07-03 | End: 2023-07-03

## 2023-07-03 RX ORDER — ALBUTEROL SULFATE 90 UG/1
AEROSOL, METERED RESPIRATORY (INHALATION)
Qty: 18 G | Refills: 3 | Status: SHIPPED | OUTPATIENT
Start: 2023-07-03

## 2023-07-03 RX ORDER — FLUTICASONE FUROATE AND VILANTEROL 100; 25 UG/1; UG/1
POWDER RESPIRATORY (INHALATION)
Qty: 60 EACH | Refills: 3 | Status: SHIPPED | OUTPATIENT
Start: 2023-07-03

## 2023-07-03 ASSESSMENT — ENCOUNTER SYMPTOMS
HEMOPTYSIS: 0
VOMITING: 0
WHEEZING: 1
NAUSEA: 0
EYE DISCHARGE: 0
FALLS: 0
FOCAL WEAKNESS: 0
COUGH: 0
CHILLS: 0
FEVER: 0
SPUTUM PRODUCTION: 0
SHORTNESS OF BREATH: 0
EYE REDNESS: 0

## 2023-07-03 ASSESSMENT — FIBROSIS 4 INDEX: FIB4 SCORE: 0.57

## 2023-07-03 NOTE — ASSESSMENT & PLAN NOTE
No recent exacerbations/hospitalizations. Only uses PRN albuterol during spring and fall - not on any currently. MMRC 0.    - LABA/ICS PRN  - during fall, once it gets closer to flu/viral season, start using LABA/ICS as a maintenance inhaler and PRN albuterol as rescue  - continues to stay active/exercise routinely  - has f/u w/ENT to evaluate for sinus - was told he might need some sinus surgery a few years ago so will be getting this re-evaluated

## 2023-07-03 NOTE — PROGRESS NOTES
Pulmonary Clinic Note    Chief Complaint:  Chief Complaint   Patient presents with    Establish Care     Ref by: CINDY Hallman DX: Mild intermittent asthma, uncomplicated, Reactive airway disease       HPI:   Amadeo Fernandez is a very pleasant 47 y.o. male with who presents to pulmonary clinic for asthma.     Patient states he has had asthma since he was a child. He reports wheezing and dyspnea that's most notable when there's a lot of allergens in the environment - spring and fall. Usually ends up with an asthma exacerbation from walking pneumonia or viral infection in the fall and has to take a course of steroids. Has only used PRN albuterol. Hasn't been hospitalized or intubated for his asthma exacerbation.  He is very active w/o any limitations. Works for [employer] works outdoors - no other exposures.      Past Medical History:   Diagnosis Date    Anxiety     Asthma     Back pain     Back pain     Bronchitis     Depression     Diarrhea     Dizziness     Painful joint     Pneumonia     Rhinitis     Seizure disorder (HCC)     Sore muscles     Wears glasses     Wheezing        Past Surgical History:   Procedure Laterality Date    OTHER ORTHOPEDIC SURGERY      bilat shoulders       Social History     Socioeconomic History    Marital status:      Spouse name: Not on file    Number of children: Not on file    Years of education: Not on file    Highest education level: Not on file   Occupational History    Not on file   Tobacco Use    Smoking status: Never    Smokeless tobacco: Current     Types: Chew    Tobacco comments:     Chew tabacoo   Substance and Sexual Activity    Alcohol use: Yes     Alcohol/week: 1.8 oz     Types: 3 Cans of beer per week     Comment: Only drink on weekends sometimes    Drug use: Not Currently     Types: Marijuana, Inhaled     Comment: When I was younger    Sexual activity: Yes   Other Topics Concern    Not on file   Social History Narrative    Not on file     Social  "Determinants of Health     Financial Resource Strain: Not on file   Food Insecurity: Not on file   Transportation Needs: Not on file   Physical Activity: Not on file   Stress: Not on file   Social Connections: Not on file   Intimate Partner Violence: Not on file   Housing Stability: Not on file          Family History   Problem Relation Age of Onset    Asthma Mother        Current Outpatient Medications on File Prior to Visit   Medication Sig Dispense Refill    OXcarbazepine (TRILEPTAL) 300 MG Tab TAKE ONE TABLET BY MOUTH TWICE A DAY 60 tablet 5    sertraline (ZOLOFT) 100 MG Tab Take 150 mg by mouth every day.       No current facility-administered medications on file prior to visit.       Allergies: Patient has no known allergies.      ROS:   Review of Systems   Constitutional:  Negative for chills and fever.   HENT:  Negative for hearing loss.    Eyes:  Negative for discharge and redness.   Respiratory:  Positive for wheezing. Negative for cough, hemoptysis, sputum production and shortness of breath.    Cardiovascular:  Negative for chest pain.   Gastrointestinal:  Negative for nausea and vomiting.   Musculoskeletal:  Negative for falls.   Skin:  Negative for rash.   Neurological:  Negative for focal weakness.   Endo/Heme/Allergies:  Positive for environmental allergies.       Vitals:  /76 (BP Location: Right arm, Patient Position: Sitting, BP Cuff Size: Adult)   Pulse 61   Resp 16   Ht 1.753 m (5' 9\")   Wt 82.6 kg (182 lb)   SpO2 98%     Physical Exam:  Physical Exam  Vitals and nursing note reviewed.   Constitutional:       General: He is not in acute distress.     Appearance: Normal appearance. He is not ill-appearing or toxic-appearing.   HENT:      Head: Normocephalic and atraumatic.      Nose: Nose normal.      Mouth/Throat:      Mouth: Mucous membranes are moist.   Eyes:      General: No scleral icterus.     Conjunctiva/sclera: Conjunctivae normal.   Cardiovascular:      Rate and Rhythm: Normal " rate and regular rhythm.   Pulmonary:      Effort: Pulmonary effort is normal. No respiratory distress.      Breath sounds: No wheezing, rhonchi or rales.   Abdominal:      Palpations: Abdomen is soft.   Musculoskeletal:         General: No deformity or signs of injury. Normal range of motion.      Cervical back: Normal range of motion.   Skin:     General: Skin is warm and dry.   Neurological:      General: No focal deficit present.      Mental Status: He is alert. Mental status is at baseline.   Psychiatric:         Mood and Affect: Mood normal.         Behavior: Behavior normal.         Data:  NA    Assessment/Plan:    Problem List Items Addressed This Visit       Mild intermittent asthma without complication     No recent exacerbations/hospitalizations. Only uses PRN albuterol during spring and fall - not on any currently. MMRC 0.    - LABA/ICS PRN  - during fall, once it gets closer to flu/viral season, start using LABA/ICS as a maintenance inhaler and PRN albuterol as rescue  - continues to stay active/exercise routinely  - has f/u w/ENT to evaluate for sinus - was told he might need some sinus surgery a few years ago so will be getting this re-evaluated              Relevant Medications    fluticasone furoate-vilanterol (BREO ELLIPTA) 100-25 MCG/ACT AEROSOL POWDER, BREATH ACTIVATED    albuterol 108 (90 Base) MCG/ACT Aero Soln inhalation aerosol       Return in about 6 months (around 1/3/2024).     This note was generated using voice recognition software which has a chance of producing errors of grammar and possibly content.  I have made every reasonable attempt to find and correct any obvious errors, but it should be expected that some may not be found prior to finalization of this note.    Time spent in record review prior to patient arrival, reviewing results, and in face-to-face encounter totaled 45 min, excluding any procedures if performed.    Tamiko Javed MD  Pulmonary and Critical Care  [facility]

## 2023-07-14 ENCOUNTER — APPOINTMENT (OUTPATIENT)
Dept: ADMISSIONS | Facility: MEDICAL CENTER | Age: 48
End: 2023-07-14
Attending: OTOLARYNGOLOGY
Payer: COMMERCIAL

## 2023-07-20 ENCOUNTER — PRE-ADMISSION TESTING (OUTPATIENT)
Dept: ADMISSIONS | Facility: MEDICAL CENTER | Age: 48
End: 2023-07-20
Attending: OTOLARYNGOLOGY
Payer: COMMERCIAL

## 2023-07-26 ENCOUNTER — HOSPITAL ENCOUNTER (OUTPATIENT)
Dept: RADIOLOGY | Facility: MEDICAL CENTER | Age: 48
End: 2023-07-26
Payer: COMMERCIAL

## 2023-07-27 ENCOUNTER — ANESTHESIA EVENT (OUTPATIENT)
Dept: SURGERY | Facility: MEDICAL CENTER | Age: 48
End: 2023-07-27
Payer: COMMERCIAL

## 2023-07-27 ENCOUNTER — HOSPITAL ENCOUNTER (OUTPATIENT)
Facility: MEDICAL CENTER | Age: 48
End: 2023-07-27
Attending: OTOLARYNGOLOGY | Admitting: OTOLARYNGOLOGY
Payer: COMMERCIAL

## 2023-07-27 ENCOUNTER — ANESTHESIA (OUTPATIENT)
Dept: SURGERY | Facility: MEDICAL CENTER | Age: 48
End: 2023-07-27
Payer: COMMERCIAL

## 2023-07-27 VITALS
WEIGHT: 178.13 LBS | BODY MASS INDEX: 26.38 KG/M2 | RESPIRATION RATE: 20 BRPM | DIASTOLIC BLOOD PRESSURE: 91 MMHG | SYSTOLIC BLOOD PRESSURE: 144 MMHG | HEIGHT: 69 IN | OXYGEN SATURATION: 93 % | HEART RATE: 69 BPM | TEMPERATURE: 98.4 F

## 2023-07-27 DIAGNOSIS — J32.4 CHRONIC PANSINUSITIS: ICD-10-CM

## 2023-07-27 LAB — PATHOLOGY CONSULT NOTE: NORMAL

## 2023-07-27 PROCEDURE — 160035 HCHG PACU - 1ST 60 MINS PHASE I: Performed by: OTOLARYNGOLOGY

## 2023-07-27 PROCEDURE — 160046 HCHG PACU - 1ST 60 MINS PHASE II: Performed by: OTOLARYNGOLOGY

## 2023-07-27 PROCEDURE — 700111 HCHG RX REV CODE 636 W/ 250 OVERRIDE (IP): Performed by: ANESTHESIOLOGY

## 2023-07-27 PROCEDURE — 700101 HCHG RX REV CODE 250: Performed by: ANESTHESIOLOGY

## 2023-07-27 PROCEDURE — 700105 HCHG RX REV CODE 258: Mod: JZ | Performed by: OTOLARYNGOLOGY

## 2023-07-27 PROCEDURE — 700111 HCHG RX REV CODE 636 W/ 250 OVERRIDE (IP): Mod: JZ | Performed by: ANESTHESIOLOGY

## 2023-07-27 PROCEDURE — 700102 HCHG RX REV CODE 250 W/ 637 OVERRIDE(OP): Performed by: OTOLARYNGOLOGY

## 2023-07-27 PROCEDURE — 88311 DECALCIFY TISSUE: CPT | Mod: 91

## 2023-07-27 PROCEDURE — 00160 ANES PX NOSE&SINUS NOS: CPT | Performed by: ANESTHESIOLOGY

## 2023-07-27 PROCEDURE — 160047 HCHG PACU  - EA ADDL 30 MINS PHASE II: Performed by: OTOLARYNGOLOGY

## 2023-07-27 PROCEDURE — 88305 TISSUE EXAM BY PATHOLOGIST: CPT

## 2023-07-27 PROCEDURE — 160041 HCHG SURGERY MINUTES - EA ADDL 1 MIN LEVEL 4: Performed by: OTOLARYNGOLOGY

## 2023-07-27 PROCEDURE — 160002 HCHG RECOVERY MINUTES (STAT): Performed by: OTOLARYNGOLOGY

## 2023-07-27 PROCEDURE — 160025 RECOVERY II MINUTES (STATS): Performed by: OTOLARYNGOLOGY

## 2023-07-27 PROCEDURE — 700111 HCHG RX REV CODE 636 W/ 250 OVERRIDE (IP)

## 2023-07-27 PROCEDURE — 160029 HCHG SURGERY MINUTES - 1ST 30 MINS LEVEL 4: Performed by: OTOLARYNGOLOGY

## 2023-07-27 PROCEDURE — A9270 NON-COVERED ITEM OR SERVICE: HCPCS | Performed by: OTOLARYNGOLOGY

## 2023-07-27 PROCEDURE — 700101 HCHG RX REV CODE 250: Performed by: OTOLARYNGOLOGY

## 2023-07-27 PROCEDURE — 160048 HCHG OR STATISTICAL LEVEL 1-5: Performed by: OTOLARYNGOLOGY

## 2023-07-27 PROCEDURE — 160009 HCHG ANES TIME/MIN: Performed by: OTOLARYNGOLOGY

## 2023-07-27 DEVICE — SPLINT INTRANASAL STERILE POSISEP X 0.6IN X 2.0IN (5EA/PK): Type: IMPLANTABLE DEVICE | Site: NOSE | Status: FUNCTIONAL

## 2023-07-27 RX ORDER — BACITRACIN ZINC 500 [USP'U]/G
OINTMENT TOPICAL
Status: DISCONTINUED
Start: 2023-07-27 | End: 2023-07-27 | Stop reason: HOSPADM

## 2023-07-27 RX ORDER — MIDAZOLAM HYDROCHLORIDE 1 MG/ML
1 INJECTION INTRAMUSCULAR; INTRAVENOUS
Status: DISCONTINUED | OUTPATIENT
Start: 2023-07-27 | End: 2023-07-27 | Stop reason: HOSPADM

## 2023-07-27 RX ORDER — ONDANSETRON 2 MG/ML
INJECTION INTRAMUSCULAR; INTRAVENOUS PRN
Status: DISCONTINUED | OUTPATIENT
Start: 2023-07-27 | End: 2023-07-27 | Stop reason: SURG

## 2023-07-27 RX ORDER — CEFAZOLIN SODIUM 1 G/3ML
INJECTION, POWDER, FOR SOLUTION INTRAMUSCULAR; INTRAVENOUS PRN
Status: DISCONTINUED | OUTPATIENT
Start: 2023-07-27 | End: 2023-07-27 | Stop reason: SURG

## 2023-07-27 RX ORDER — IPRATROPIUM BROMIDE AND ALBUTEROL SULFATE 2.5; .5 MG/3ML; MG/3ML
3 SOLUTION RESPIRATORY (INHALATION)
Status: DISCONTINUED | OUTPATIENT
Start: 2023-07-27 | End: 2023-07-27 | Stop reason: HOSPADM

## 2023-07-27 RX ORDER — HYDROMORPHONE HYDROCHLORIDE 1 MG/ML
0.4 INJECTION, SOLUTION INTRAMUSCULAR; INTRAVENOUS; SUBCUTANEOUS
Status: DISCONTINUED | OUTPATIENT
Start: 2023-07-27 | End: 2023-07-27 | Stop reason: HOSPADM

## 2023-07-27 RX ORDER — CEPHALEXIN 500 MG/1
500 CAPSULE ORAL 3 TIMES DAILY
Qty: 30 CAPSULE | Refills: 0 | Status: SHIPPED | OUTPATIENT
Start: 2023-07-27 | End: 2023-08-06

## 2023-07-27 RX ORDER — ONDANSETRON 2 MG/ML
4 INJECTION INTRAMUSCULAR; INTRAVENOUS
Status: COMPLETED | OUTPATIENT
Start: 2023-07-27 | End: 2023-07-27

## 2023-07-27 RX ORDER — HYDRALAZINE HYDROCHLORIDE 20 MG/ML
5 INJECTION INTRAMUSCULAR; INTRAVENOUS
Status: DISCONTINUED | OUTPATIENT
Start: 2023-07-27 | End: 2023-07-27 | Stop reason: HOSPADM

## 2023-07-27 RX ORDER — SODIUM CHLORIDE, SODIUM LACTATE, POTASSIUM CHLORIDE, CALCIUM CHLORIDE 600; 310; 30; 20 MG/100ML; MG/100ML; MG/100ML; MG/100ML
INJECTION, SOLUTION INTRAVENOUS CONTINUOUS
Status: ACTIVE | OUTPATIENT
Start: 2023-07-27 | End: 2023-07-27

## 2023-07-27 RX ORDER — EPINEPHRINE 1 MG/ML(1)
AMPUL (ML) INJECTION
Status: DISCONTINUED | OUTPATIENT
Start: 2023-07-27 | End: 2023-07-27 | Stop reason: HOSPADM

## 2023-07-27 RX ORDER — EPINEPHRINE 1 MG/ML
INJECTION INTRAMUSCULAR; INTRAVENOUS; SUBCUTANEOUS
Status: DISCONTINUED
Start: 2023-07-27 | End: 2023-07-27 | Stop reason: HOSPADM

## 2023-07-27 RX ORDER — MEPERIDINE HYDROCHLORIDE 25 MG/ML
12.5 INJECTION INTRAMUSCULAR; INTRAVENOUS; SUBCUTANEOUS
Status: DISCONTINUED | OUTPATIENT
Start: 2023-07-27 | End: 2023-07-27 | Stop reason: HOSPADM

## 2023-07-27 RX ORDER — BACITRACIN ZINC 500 [USP'U]/G
OINTMENT TOPICAL
Status: DISCONTINUED | OUTPATIENT
Start: 2023-07-27 | End: 2023-07-27 | Stop reason: HOSPADM

## 2023-07-27 RX ORDER — LIDOCAINE HYDROCHLORIDE 20 MG/ML
INJECTION, SOLUTION EPIDURAL; INFILTRATION; INTRACAUDAL; PERINEURAL PRN
Status: DISCONTINUED | OUTPATIENT
Start: 2023-07-27 | End: 2023-07-27 | Stop reason: SURG

## 2023-07-27 RX ORDER — HALOPERIDOL 5 MG/ML
1 INJECTION INTRAMUSCULAR
Status: DISCONTINUED | OUTPATIENT
Start: 2023-07-27 | End: 2023-07-27 | Stop reason: HOSPADM

## 2023-07-27 RX ORDER — LABETALOL HYDROCHLORIDE 5 MG/ML
5 INJECTION, SOLUTION INTRAVENOUS
Status: DISCONTINUED | OUTPATIENT
Start: 2023-07-27 | End: 2023-07-27 | Stop reason: HOSPADM

## 2023-07-27 RX ORDER — LIDOCAINE HYDROCHLORIDE AND EPINEPHRINE 10; 10 MG/ML; UG/ML
INJECTION, SOLUTION INFILTRATION; PERINEURAL
Status: DISCONTINUED
Start: 2023-07-27 | End: 2023-07-27 | Stop reason: HOSPADM

## 2023-07-27 RX ORDER — LIDOCAINE HYDROCHLORIDE AND EPINEPHRINE 10; 10 MG/ML; UG/ML
INJECTION, SOLUTION INFILTRATION; PERINEURAL
Status: DISCONTINUED | OUTPATIENT
Start: 2023-07-27 | End: 2023-07-27 | Stop reason: HOSPADM

## 2023-07-27 RX ORDER — ROCURONIUM BROMIDE 10 MG/ML
INJECTION, SOLUTION INTRAVENOUS PRN
Status: DISCONTINUED | OUTPATIENT
Start: 2023-07-27 | End: 2023-07-27 | Stop reason: SURG

## 2023-07-27 RX ORDER — DEXAMETHASONE SODIUM PHOSPHATE 4 MG/ML
INJECTION, SOLUTION INTRA-ARTICULAR; INTRALESIONAL; INTRAMUSCULAR; INTRAVENOUS; SOFT TISSUE PRN
Status: DISCONTINUED | OUTPATIENT
Start: 2023-07-27 | End: 2023-07-27 | Stop reason: SURG

## 2023-07-27 RX ORDER — OXYCODONE HCL 5 MG/5 ML
5 SOLUTION, ORAL ORAL
Status: DISCONTINUED | OUTPATIENT
Start: 2023-07-27 | End: 2023-07-27 | Stop reason: HOSPADM

## 2023-07-27 RX ORDER — HYDROMORPHONE HYDROCHLORIDE 1 MG/ML
0.1 INJECTION, SOLUTION INTRAMUSCULAR; INTRAVENOUS; SUBCUTANEOUS
Status: DISCONTINUED | OUTPATIENT
Start: 2023-07-27 | End: 2023-07-27 | Stop reason: HOSPADM

## 2023-07-27 RX ORDER — TRANEXAMIC ACID 100 MG/ML
INJECTION, SOLUTION INTRAVENOUS PRN
Status: DISCONTINUED | OUTPATIENT
Start: 2023-07-27 | End: 2023-07-27 | Stop reason: SURG

## 2023-07-27 RX ORDER — SODIUM CHLORIDE, SODIUM LACTATE, POTASSIUM CHLORIDE, CALCIUM CHLORIDE 600; 310; 30; 20 MG/100ML; MG/100ML; MG/100ML; MG/100ML
INJECTION, SOLUTION INTRAVENOUS CONTINUOUS
Status: DISCONTINUED | OUTPATIENT
Start: 2023-07-27 | End: 2023-07-27 | Stop reason: HOSPADM

## 2023-07-27 RX ORDER — OXYCODONE HCL 5 MG/5 ML
10 SOLUTION, ORAL ORAL
Status: DISCONTINUED | OUTPATIENT
Start: 2023-07-27 | End: 2023-07-27 | Stop reason: HOSPADM

## 2023-07-27 RX ORDER — HYDROMORPHONE HYDROCHLORIDE 1 MG/ML
0.2 INJECTION, SOLUTION INTRAMUSCULAR; INTRAVENOUS; SUBCUTANEOUS
Status: DISCONTINUED | OUTPATIENT
Start: 2023-07-27 | End: 2023-07-27 | Stop reason: HOSPADM

## 2023-07-27 RX ADMIN — LIDOCAINE HYDROCHLORIDE 100 MG: 20 INJECTION, SOLUTION EPIDURAL; INFILTRATION; INTRACAUDAL at 09:56

## 2023-07-27 RX ADMIN — FENTANYL CITRATE 100 MCG: 50 INJECTION, SOLUTION INTRAMUSCULAR; INTRAVENOUS at 09:56

## 2023-07-27 RX ADMIN — ONDANSETRON 4 MG: 2 INJECTION INTRAMUSCULAR; INTRAVENOUS at 13:41

## 2023-07-27 RX ADMIN — SODIUM CHLORIDE, POTASSIUM CHLORIDE, SODIUM LACTATE AND CALCIUM CHLORIDE: 600; 310; 30; 20 INJECTION, SOLUTION INTRAVENOUS at 09:56

## 2023-07-27 RX ADMIN — CEFAZOLIN 2 G: 1 INJECTION, POWDER, FOR SOLUTION INTRAMUSCULAR; INTRAVENOUS at 10:02

## 2023-07-27 RX ADMIN — FENTANYL CITRATE 100 MCG: 50 INJECTION, SOLUTION INTRAMUSCULAR; INTRAVENOUS at 11:00

## 2023-07-27 RX ADMIN — DEXAMETHASONE SODIUM PHOSPHATE 8 MG: 4 INJECTION INTRA-ARTICULAR; INTRALESIONAL; INTRAMUSCULAR; INTRAVENOUS; SOFT TISSUE at 09:57

## 2023-07-27 RX ADMIN — ROCURONIUM BROMIDE 50 MG: 50 INJECTION, SOLUTION INTRAVENOUS at 09:57

## 2023-07-27 RX ADMIN — PROPOFOL 50 MCG/KG/MIN: 10 INJECTION, EMULSION INTRAVENOUS at 10:01

## 2023-07-27 RX ADMIN — TRANEXAMIC ACID 1000 MG: 100 INJECTION, SOLUTION INTRAVENOUS at 11:00

## 2023-07-27 RX ADMIN — ONDANSETRON 4 MG: 2 INJECTION INTRAMUSCULAR; INTRAVENOUS at 09:57

## 2023-07-27 RX ADMIN — PROPOFOL 200 MG: 10 INJECTION, EMULSION INTRAVENOUS at 09:56

## 2023-07-27 RX ADMIN — SUGAMMADEX 200 MG: 100 INJECTION, SOLUTION INTRAVENOUS at 11:32

## 2023-07-27 RX ADMIN — ROCURONIUM BROMIDE 50 MG: 50 INJECTION, SOLUTION INTRAVENOUS at 10:37

## 2023-07-27 ASSESSMENT — FIBROSIS 4 INDEX: FIB4 SCORE: 0.57

## 2023-07-27 ASSESSMENT — PAIN SCALES - GENERAL: PAIN_LEVEL: 0

## 2023-07-27 NOTE — OR NURSING
1146 - Patient arrived from OR, report received from anesthesia and RN. Orders verified, [id number] in place. Lung sounds present and clear bilaterally w/ good air exchange. Scant bloody drainage noted from nares, nasal packing in place     1209 - Patient awake and alert, denies pain     1218 - Patient spouse, Gabrielle updated via telephone. All questions answered. Discharge instructions reviewed w/ patient and spouse     1239 - Nasal packing removed per MD order. Gauze and nasal sling placed      1240 - Criteria met to transition patient to Phase II recovery     1341 - Ondansetron administered for nausea.   Patient assisted to restroom, voided. Steady gait     1401 - Patient reports improvement in nausea.   All discharge criteria met. IV removed, patient escorted out in stable condition w/ all belongings.   Patient discharged

## 2023-07-27 NOTE — ANESTHESIA POSTPROCEDURE EVALUATION
Patient: Amadeo Fernandez    Procedure Summary     Date: 07/27/23 Room / Location: [facility] ROOM 22 / SURGERY SAME DAY [facility]    Anesthesia Start: 0956 Anesthesia Stop: 1149    Procedures:       SEPTOPLASTY, BILATERAL EXCISION INFERIOR TURBINATE, PARTIAL , ANY METHOD, STEREOTACTIC COMPUTER-ASSISTED (NAVIGATIONAL) PROCEDURE;CRANIAL, EXTRADURAL, BILATERAL ENDOSCOPIC TOTAL ETHMOIDECTOMY INCLUDING ENDOSCOPIC SPHENOIDOTOMY WITH TISSUE REMOVAL BILATERAL ENDOSCOPIC MAXILLARY TISSUE REMOVAL, RIGHT ENDOSCOPIC FRONTAL SINUS EXPLORATION (Bilateral: Nose)      ENDOSCOPY, PARANASAL SINUS (Bilateral: Nose) Diagnosis: (recurrent maxillary sinusitis, chronic ethmoid sinusitis, deviated nasal septum, allergic rhinitis)    Surgeons: Giancarlo Doyle M.D. Responsible Provider: Pablo Figueredo M.D.    Anesthesia Type: general ASA Status: 2          Final Anesthesia Type: general  Last vitals  BP   Blood Pressure: (!) 142/100    Temp   36.9 °C (98.4 °F)    Pulse   100   Resp   18    SpO2   97 %      Anesthesia Post Evaluation    Patient location during evaluation: PACU  Patient participation: complete - patient participated  Level of consciousness: awake and alert  Pain score: 0    Airway patency: patent  Anesthetic complications: no  Cardiovascular status: hemodynamically stable  Respiratory status: acceptable  Hydration status: euvolemic    PONV: none          No notable events documented.     Nurse Pain Score: 8 (NPRS)

## 2023-07-27 NOTE — ANESTHESIA PROCEDURE NOTES
Airway    Date/Time: 7/27/2023 10:00 AM    Performed by: Pablo Figueredo M.D.  Authorized by: Pablo Figueredo M.D.    Location:  OR  Urgency:  Elective  Indications for Airway Management:  Anesthesia      Spontaneous Ventilation: absent    Sedation Level:  Deep  Preoxygenated: Yes    Patient Position:  Sniffing  Mask Difficulty Assessment:  0 - not attempted  Final Airway Type:  Endotracheal airway  Final Endotracheal Airway:  ETT  Cuffed: Yes    Technique Used for Successful ETT Placement:  Direct laryngoscopy    Insertion Site:  Oral  Blade Type:  Gill  Laryngoscope Blade/Videolaryngoscope Blade Size:  2  ETT Size (mm):  7.0  Measured from:  Teeth  ETT to Teeth (cm):  22  Placement Verified by: auscultation and capnometry    Cormack-Lehane Classification:  Grade I - full view of glottis  Number of Attempts at Approach:  1

## 2023-07-27 NOTE — DISCHARGE INSTRUCTIONS
If any questions arise, call your provider.  If your provider is not available, please feel free to call the Surgical [facility] at [phone]      MEDICATIONS: Resume taking daily medication.  Take prescribed pain medication with food.  If no medication is prescribed, you may take non-aspirin pain medication if needed.  PAIN MEDICATION CAN BE VERY CONSTIPATING.  Take a stool softener or laxative such as senokot, pericolace, or milk of magnesia if needed.    Last pain medication given at _________     Special Instructions: Begin Neilmed rinses 5x/day - start tomorrow 7/28/23   Follow up in 1 week     Prescriptions: Cephalexin (Keflex) - antibiotic      What to Expect Post Anesthesia    Rest and take it easy for the first 24 hours.  A responsible adult is recommended to remain with you during that time.  It is normal to feel sleepy.  We encourage you to not do anything that requires balance, judgment or coordination.    FOR 24 HOURS DO NOT:  Drive, operate machinery or run household appliances.  Drink beer or alcoholic beverages.  Make important decisions or sign legal documents.    To avoid nausea, slowly advance diet as tolerated, avoiding spicy or greasy foods for the first day.  Add more substantial food to your diet according to your provider's instructions.  Babies can be fed formula or breast milk as soon as they are hungry.  INCREASE FLUIDS AND FIBER TO AVOID CONSTIPATION.    MILD FLU-LIKE SYMPTOMS ARE NORMAL.  YOU MAY EXPERIENCE GENERALIZED MUSCLE ACHES, THROAT IRRITATION, HEADACHE AND/OR SOME NAUSEA.    Diet    Resume your normal diet as tolerated.  A diet low in cholesterol, fat, and sodium is recommended for good health       Septoplasty, Care After    The following information offers guidance on how to care for yourself after your procedure. Your health care provider may also give you more specific instructions. If you have problems or questions, contact your health care provider.  What can I expect  after the procedure?  After the procedure, it is common to have:  A mild headache.  A stuffy nose.  A feeling of fullness in your ears.  Bloody fluid coming from your nose.  Follow these instructions at home:  Medicines    Take over-the-counter and prescription medicines only as told by your health care provider.  If you were prescribed an antibiotic medicine, take it as told by your health care provider. Do not stop using the antibiotic even if you start to feel better.  Ask your health care provider if the medicine prescribed to you:  Requires you to avoid driving or using machinery.  Can cause constipation. You may need to take these actions to prevent or treat constipation:  Drink enough fluid to keep your urine pale yellow.  Take over-the-counter or prescription medicines.  Eat foods that are high in fiber, such as beans, whole grains, and fresh fruits and vegetables.  Limit foods that are high in fat and processed sugars, such as fried or sweet foods.  What to avoid    Avoid eating hot and spicy foods for several days after surgery or as told by your health care provider.  Do not blow your nose for 2 weeks after surgery or as told by your health care provider.  Avoid strenuous activities for 2 weeks. These include activities such as running or playing sports. These activities can cause nosebleeds.  Avoid straining when having a bowel movement. Straining can cause a nosebleed.  Avoid very hot or steamy showers for several days after surgery or as told by your health care provider.  Do not lift anything that is heavier than 10 lb (4.5 kg) until your health care provider says that it is safe.  General instructions    You may be asked to clean your nostrils with an over-the-counter saline nasal spray. This will help to clear the crusts and blood clots in your nose. Use it as told by your health care provider.  Raise (elevate) your head while you are lying down.  If you have nasal splints, follow your health care  provider's instructions about removal.  If your nose was packed with gauze, follow your health care provider's instructions about removal.  Do not push a dressing into your nose or replace the removable packing after it has been removed.  Keep all follow-up visits. This is important.  Contact a health care provider if you:  Develop swelling or increased pain in your nose.  Have yellowish-white fluid (pus) coming from your nose.  Have a fever.  Have nausea that does not go away.  Cannot breathe through your nose after nasal packing and splints have been removed.  Get help right away if you:  Are short of breath.  Feel dizzy or you faint.  Have vision changes.  Are bleeding heavily from your nose.  Vomit every time you eat or drink.  Have a severe headache or a stiff neck.  Summary  After the procedure, it is common to have a mild headache, stuffy nose, feeling of fullness in your ears, and bloody fluid coming from your nose.  Follow instructions from your health care provider about food and fluids that you should avoid.  Do not blow your nose for 2 weeks after the surgery.  Do not lift anything that is heavier than 10 lb (4.5 kg) until your health care provider says that it is safe.  This information is not intended to replace advice given to you by your health care provider. Make sure you discuss any questions you have with your health care provider.  Document Revised: 08/15/2022 Document Reviewed: 08/15/2022  Elsevier Patient Education © 2023 [employer]    Discharge Education for patients on HENRY (Obstructive Sleep Apnea) Protocol    Prior to receiving sedation or anesthesia, we screen all patients for Obstructive Sleep Apnea.  During your screening, you were identified as having suspected, but not confirmed Obstructive Sleep Apnea(HENRY).    What is Obstructive Sleep Apnea?  Sleep apnea (AP-ne-ah) is a common disorder which involves breathing pauses that occur during sleep.  These can last from 10 seconds to a  minute or longer.  Normal breathing resumes often with a loud snort or choking sound.    Sleep apnea occurs in all age groups and both genders but is more common in men and people over 40 years of age.  It has been estimated that as many as 18 million Americans have sleep apnea.  Most people who have sleep apnea don’t know they have it because it only occurs during sleep.  A family member and/or bed partner may first notice the signs of sleep apnea.  Sleep apnea is a chronic (ongoing) condition that disrupts the quality and quantity of your sleep repeatedly throughout the night.  This often results in excessive daytime sleepiness or fatigue during the day.  It may also contribute to high blood pressure, heart problems, and complications following medications used for surgery and procedures.    To establish a definitive diagnosis, further testing from a specialist would be needed.  We recommend that you follow up with your primary care physician.    We recommend that you should be with an adult observer for at least 24 hours after your sedation/anesthesia.  If you have a CPAP machine, you should wear it during any sleep period (day or night) for the week following your procedure.  We encourage you to sleep on your side or in a sitting position, even with napping.  Lying flat on your back increases the risk of apnea and airway obstruction during your post procedure recovery period.    It is important to prevent over-sedation that could increase your risk for apnea.  Please take all pain medication as directed by your physician.  If you are not getting pain relief, please contact your physician to discuss possible approaches to relieving pain while minimizing medications that can affect your breathing and oxygen levels

## 2023-07-27 NOTE — ANESTHESIA PREPROCEDURE EVALUATION
Case: [id number] Date/Time: 07/27/23 0945    Procedures:       SEPTOPLASTY, BILATERAL EXCISION INFERIOR TURBINATE, PARTIAL OR COMPLETE, ANY METHOD, STEREOTACTIC COMPUTER-ASSISTED (NAVIGATIONAL) PROCEDURE;CRANIAL, EXTRADURAL, BILATERAL ENDOSCOPIC TOTAL ETHMOIDECTOMY INCLUDING ENDOSCOPIC SPHENOIDOTOMY WITH TISSUE REMOVAL BILATERAL ENDOSCOPIC MAXILLARY TISSUE REMOVAL, BILATERAL ENDOSCOPIC FRONTAL SINUS EXPLORATION (Bilateral)      ENDOSCOPY, PARANASAL SINUS    Pre-op diagnosis: J01.01 J30.1 J32.2 J34.2    Location: [facility] ROOM 22 / SURGERY SAME DAY [facility]    Surgeons: Giancarlo Doyle M.D.      Sinus issues.     Denies angina, dyspnea, GERD.     NPO.     Relevant Problems   PULMONARY   (positive) Mild intermittent asthma without complication      NEURO   (positive) Seizure cerebral (HCC)      Other   (positive) Memory loss, short term   (positive) Spell of altered cognition   (positive) Vertigo       Physical Exam    Airway   Mallampati: II  TM distance: >3 FB  Neck ROM: full       Cardiovascular - normal exam  Rhythm: regular  Rate: normal  (-) murmur     Dental - normal exam           Pulmonary - normal exam  Breath sounds clear to auscultation     Abdominal    Neurological - normal exam                 Anesthesia Plan    ASA 2       Plan - general       Airway plan will be ETT          Induction: intravenous    Postoperative Plan: Postoperative administration of opioids is intended.    Pertinent diagnostic labs and testing reviewed    Informed Consent:    Anesthetic plan and risks discussed with patient.    Use of blood products discussed with: patient whom consented to blood products.

## 2023-07-27 NOTE — OP REPORT
DATE OF SERVICE:  07/27/2023     PREOPERATIVE DIAGNOSES:    1.  Chronic sinusitis with polyps.  2.  Septal deviation.  3.  Turbinate hypertrophy.     POSTOPERATIVE DIAGNOSES:    1.  Chronic sinusitis with polyps.  2.  Septal deviation.  3.  Turbinate hypertrophy.     PROCEDURES:    1.  Septoplasty.  2.  Bilateral partial inferior turbinate excision.  3.  Bilateral endoscopic total ethmoidectomy.  4.  Bilateral endoscopic maxillary antrostomy with tissue removal.  5.  Bilateral endoscopic sphenoidotomy with tissue removal.  6.  Right endoscopic james bullosa resection.  7.  Right endoscopic frontal sinusotomy.  8.  With image guidance.     SURGEON:  Giancarlo Doyle MD     ASSISTANT:  None.     ANESTHESIA:  General endotracheal (Pablo Figueredo MD).     ESTIMATED BLOOD LOSS:  50 mL     COMPLICATIONS:  None.     SPECIMENS:    1.  Right sinus contents.  2.  Left sinus contents.     INDICATIONS:  A 47-year-old who has had longstanding nasal issues.  We talked   about nasal surgery about 3 years ago.  He now follows up for that.  He has   failed medical management including budesonide rinses.  I have discussed the   risks, benefits and alternatives, he elected to go ahead with the above   procedure.     FINDINGS:  Septum was deviated to the right.  There was bilateral polyposis   throughout the sinuses.     DESCRIPTION OF PROCEDURE:  The patient was brought to the operating room,   intubated by anesthesia, turned 90 degrees, draped in sterile fashion.    Timeout was performed.  A 1:1000 epinephrine-soaked pledgets stained with   fluorescein placed in the nasal cavities.  Once this was allowed to take   effect, I injected the right and left maxillary lines, sphenopalatine region   and septum with 1% lidocaine 1:100,000 epinephrine.  I registered the image   guidance, was found to be good fidelity in all 3 planes as required for this   case given the polyps.  I began sinus surgery on the right side.  I resected   the  uncinate with pediatric backbiter, microdebrider and Kerrisons.  I used   0-degree endoscope to visualize the natural ostium of the maxillary, widened   it posteriorly using a curved scissor, side biter and the microdebrider.  I   entered the bulla and resected with spoon and microdebrider and went through   the basal lamella at the intersection of horizontal and vertical portions of   it.  I entered the posterior ethmoid cells.  I resected the inferior aspect of   the superior turbinate.  There were polyps growing out of the sphenoid   ostium, which were resected using the Kerrisons and the microdebrider.  There   was some bleeding from the posterior nasal branch, which was suction   cauterized on a coag of 15.  I then widened the sphenoidotomy superolaterally.    I resected polypoid tissue.  I did this, I identified the orbit and skull   base and cleared partitions in a posterior to anterior manner.  There was a   james bullosa, which I resected the lateral lamella using the microdebrider.    I then used a 45-degree scope.  I could cannulate the frontal using the image   guidance probe and I was happy with my dissection.     I made an incision a centimeter back from the caudal septum on the left side.    I elevated a subperichondrial subperiosteal plane on the left side.  I went   through the septal cartilage just posterior to my incision, elevated   subperichondrial subperiosteal on the right side.  I resected the deviated   bone and cartilage using a Kevin, small tear in the left flap, but the   right flap was intact.  I closed the septum with quilting 4-0 plain gut   suture.     I began sinus surgery on the left side.  I resected the uncinate as I had on   the other side, identified the natural ostium, widened it posteriorly using   curved scissor, side biter and the microdebrider.  Polypoid tissue was removed   from both maxillaries.  I entered the bulla and resected with spoon and   microdebrider and  went through the basal lamella at the intersection of   horizontal and vertical portions.  I entered the posterior ethmoid cells.  I   resected the inferior aspect of the superior turbinate.  Once again, there   were polyps growing out of the sphenoid ostium, which were resected using   Kerrisons and the microdebrider and I widened the sphenoidotomy.  I identified   the orbit and skull base and cleared partitions in a posterior to anterior   manner.  I performed an Agger nasi punch here.  I was happy I did not explore   the frontal since it was largely normal on the scan.  I think it was draining   more medially and it was pretty oozy at this point, so I elected to stop.  I   then made a stab incision anterior aspect of both inferior turbinates and   elevated subperiosteal plane using a 2 mm microdebrider blade setting of 3000   RPMs to resect the stromal tissue.  I infractured, then outfractured the   inferior turbinates.  Hemostasis was good.  There was more bleeding on the   left hand side than on the right. I did suction cauterized the right inferior   turbinate and the left posterior nasal branches as well.  I placed a trimmed   piece of PosiSep in each middle meatus and inflated using saline.  I then   placed a bacitracin-coated finger cot in each middle meatus and secured them   to each other.  Counts were correct.  The patient was awakened from anesthesia   and taken to recovery in stable condition.  He will be discharged on Keflex.        ______________________________  MD MAXX Mclean/RANI    DD:  07/27/2023 11:51  DT:  07/27/2023 12:35    Job#:  [id number]

## 2023-07-27 NOTE — OR SURGEON
Immediate Post OP Note    PreOp Diagnosis: crs, septal deviation, turbinate hypertrophy      PostOp Diagnosis: same       Procedure(s):  SEPTOPLASTY,   BILATERAL EXCISION INFERIOR TURBINATE, PARTIAL , ANY METHOD,   STEREOTACTIC COMPUTER-ASSISTED (NAVIGATIONAL) PROCEDURE;CRANIAL, EXTRADURAL,   BILATERAL ENDOSCOPIC TOTAL ETHMOIDECTOMY INCLUDING ENDOSCOPIC SPHENOIDOTOMY WITH TISSUE REMOVAL BILATERAL ENDOSCOPIC MAXILLARY TISSUE REMOVAL,   RIGHT ENDOSCOPIC FRONTAL SINUS EXPLORATION   R james   - Wound Class: Clean Contaminated       Surgeon(s):  Giancarlo Doyle M.D.    Anesthesiologist/Type of Anesthesia:  Anesthesiologist: Pablo Figueredo M.D./General    Surgical Staff:  Circulator: Regina Frank R.N.  Relief Circulator: Barb Joseph R.N.  Relief Scrub: Camila Coleman C.N.A.  Scrub Person: Bonnie King; Parker Johnson    Specimens removed if any:  ID Type Source Tests Collected by Time Destination   A : left sinus Tissue Sinus PATHOLOGY SPECIMEN Giancarlo Doyle M.D. 7/27/2023 10:29 AM    B : right sinus Tissue Sinus PATHOLOGY SPECIMEN Giancarlo Doyle M.D. 7/27/2023 10:29 AM        Estimated Blood Loss: 50cc    Findings: polyps B ,sep to L     Complications: none           7/27/2023 11:40 AM Giancarlo Doyle M.D.

## 2023-07-27 NOTE — ANESTHESIA TIME REPORT
Anesthesia Start and Stop Event Times     Date Time Event    7/27/2023 0956 Ready for Procedure     0956 Anesthesia Start     1149 Anesthesia Stop        Responsible Staff  07/27/23    Name Role Begin End    Pablo Figueredo M.D. Anesth 0956 1141        Overtime Reason:  no overtime (within assigned shift)    Comments:

## 2023-09-29 ENCOUNTER — HOSPITAL ENCOUNTER (OUTPATIENT)
Dept: LAB | Facility: MEDICAL CENTER | Age: 48
End: 2023-09-29
Attending: NURSE PRACTITIONER
Payer: COMMERCIAL

## 2023-09-29 LAB
25(OH)D3 SERPL-MCNC: 51 NG/ML (ref 30–100)
ALBUMIN SERPL BCP-MCNC: 4.5 G/DL (ref 3.2–4.9)
ALBUMIN/GLOB SERPL: 1.9 G/DL
ALP SERPL-CCNC: 65 U/L (ref 30–99)
ALT SERPL-CCNC: 29 U/L (ref 2–50)
ANION GAP SERPL CALC-SCNC: 12 MMOL/L (ref 7–16)
APPEARANCE UR: CLEAR
AST SERPL-CCNC: 27 U/L (ref 12–45)
BASOPHILS # BLD AUTO: 1.5 % (ref 0–1.8)
BASOPHILS # BLD: 0.08 K/UL (ref 0–0.12)
BILIRUB SERPL-MCNC: 0.2 MG/DL (ref 0.1–1.5)
BILIRUB UR QL STRIP.AUTO: NEGATIVE
BUN SERPL-MCNC: 16 MG/DL (ref 8–22)
CALCIUM ALBUM COR SERPL-MCNC: 8.9 MG/DL (ref 8.5–10.5)
CALCIUM SERPL-MCNC: 9.3 MG/DL (ref 8.5–10.5)
CHLORIDE SERPL-SCNC: 107 MMOL/L (ref 96–112)
CHOLEST SERPL-MCNC: 137 MG/DL (ref 100–199)
CO2 SERPL-SCNC: 23 MMOL/L (ref 20–33)
COLOR UR: YELLOW
CREAT SERPL-MCNC: 0.93 MG/DL (ref 0.5–1.4)
EOSINOPHIL # BLD AUTO: 0.37 K/UL (ref 0–0.51)
EOSINOPHIL NFR BLD: 6.9 % (ref 0–6.9)
ERYTHROCYTE [DISTWIDTH] IN BLOOD BY AUTOMATED COUNT: 46.8 FL (ref 35.9–50)
FASTING STATUS PATIENT QL REPORTED: NORMAL
GFR SERPLBLD CREATININE-BSD FMLA CKD-EPI: 103 ML/MIN/1.73 M 2
GLOBULIN SER CALC-MCNC: 2.4 G/DL (ref 1.9–3.5)
GLUCOSE SERPL-MCNC: 114 MG/DL (ref 65–99)
GLUCOSE UR STRIP.AUTO-MCNC: NEGATIVE MG/DL
HCT VFR BLD AUTO: 48.5 % (ref 42–52)
HDLC SERPL-MCNC: 59 MG/DL
HGB BLD-MCNC: 16.1 G/DL (ref 14–18)
IMM GRANULOCYTES # BLD AUTO: 0.01 K/UL (ref 0–0.11)
IMM GRANULOCYTES NFR BLD AUTO: 0.2 % (ref 0–0.9)
KETONES UR STRIP.AUTO-MCNC: NEGATIVE MG/DL
LDLC SERPL CALC-MCNC: 64 MG/DL
LEUKOCYTE ESTERASE UR QL STRIP.AUTO: NEGATIVE
LYMPHOCYTES # BLD AUTO: 1.79 K/UL (ref 1–4.8)
LYMPHOCYTES NFR BLD: 33.3 % (ref 22–41)
MCH RBC QN AUTO: 32.1 PG (ref 27–33)
MCHC RBC AUTO-ENTMCNC: 33.2 G/DL (ref 32.3–36.5)
MCV RBC AUTO: 96.8 FL (ref 81.4–97.8)
MICRO URNS: NORMAL
MONOCYTES # BLD AUTO: 0.55 K/UL (ref 0–0.85)
MONOCYTES NFR BLD AUTO: 10.2 % (ref 0–13.4)
NEUTROPHILS # BLD AUTO: 2.57 K/UL (ref 1.82–7.42)
NEUTROPHILS NFR BLD: 47.9 % (ref 44–72)
NITRITE UR QL STRIP.AUTO: NEGATIVE
NRBC # BLD AUTO: 0 K/UL
NRBC BLD-RTO: 0 /100 WBC (ref 0–0.2)
PH UR STRIP.AUTO: 6 [PH] (ref 5–8)
PLATELET # BLD AUTO: 240 K/UL (ref 164–446)
PMV BLD AUTO: 10.6 FL (ref 9–12.9)
POTASSIUM SERPL-SCNC: 4.6 MMOL/L (ref 3.6–5.5)
PROT SERPL-MCNC: 6.9 G/DL (ref 6–8.2)
PROT UR QL STRIP: NEGATIVE MG/DL
RBC # BLD AUTO: 5.01 M/UL (ref 4.7–6.1)
RBC UR QL AUTO: NEGATIVE
SODIUM SERPL-SCNC: 142 MMOL/L (ref 135–145)
SP GR UR STRIP.AUTO: 1.02
T3FREE SERPL-MCNC: 3.04 PG/ML (ref 2–4.4)
TRIGL SERPL-MCNC: 69 MG/DL (ref 0–149)
TSH SERPL DL<=0.005 MIU/L-ACNC: 1.26 UIU/ML (ref 0.38–5.33)
UROBILINOGEN UR STRIP.AUTO-MCNC: 0.2 MG/DL
WBC # BLD AUTO: 5.4 K/UL (ref 4.8–10.8)

## 2023-09-29 PROCEDURE — 84481 FREE ASSAY (FT-3): CPT

## 2023-09-29 PROCEDURE — 80053 COMPREHEN METABOLIC PANEL: CPT

## 2023-09-29 PROCEDURE — 85025 COMPLETE CBC W/AUTO DIFF WBC: CPT

## 2023-09-29 PROCEDURE — 36415 COLL VENOUS BLD VENIPUNCTURE: CPT

## 2023-09-29 PROCEDURE — 84443 ASSAY THYROID STIM HORMONE: CPT

## 2023-09-29 PROCEDURE — 84439 ASSAY OF FREE THYROXINE: CPT

## 2023-09-29 PROCEDURE — 81003 URINALYSIS AUTO W/O SCOPE: CPT

## 2023-09-29 PROCEDURE — 80061 LIPID PANEL: CPT

## 2023-09-29 PROCEDURE — 82306 VITAMIN D 25 HYDROXY: CPT

## 2023-10-02 LAB — T4 FREE SERPL DIALY-MCNC: 1.2 NG/DL (ref 1.1–2.4)

## 2024-02-07 ENCOUNTER — HOSPITAL ENCOUNTER (OUTPATIENT)
Dept: RADIOLOGY | Facility: MEDICAL CENTER | Age: 49
End: 2024-02-07
Attending: NURSE PRACTITIONER
Payer: COMMERCIAL

## 2024-02-07 DIAGNOSIS — R05.3 CHRONIC COUGH: ICD-10-CM

## 2024-02-07 DIAGNOSIS — J45.40 MODERATE PERSISTENT ASTHMA, UNSPECIFIED WHETHER COMPLICATED: ICD-10-CM

## 2024-02-07 PROCEDURE — 71260 CT THORAX DX C+: CPT

## 2024-02-07 PROCEDURE — 700117 HCHG RX CONTRAST REV CODE 255: Performed by: NURSE PRACTITIONER

## 2024-02-07 RX ADMIN — IOHEXOL 75 ML: 350 INJECTION, SOLUTION INTRAVENOUS at 14:05

## 2024-03-25 ENCOUNTER — HOSPITAL ENCOUNTER (OUTPATIENT)
Dept: LAB | Facility: MEDICAL CENTER | Age: 49
End: 2024-03-25
Attending: NURSE PRACTITIONER
Payer: COMMERCIAL

## 2024-03-25 LAB
25(OH)D3 SERPL-MCNC: 53 NG/ML (ref 30–100)
ALBUMIN SERPL BCP-MCNC: 4.7 G/DL (ref 3.2–4.9)
ALBUMIN/GLOB SERPL: 2 G/DL
ALP SERPL-CCNC: 64 U/L (ref 30–99)
ALT SERPL-CCNC: 19 U/L (ref 2–50)
ANION GAP SERPL CALC-SCNC: 13 MMOL/L (ref 7–16)
APPEARANCE UR: CLEAR
AST SERPL-CCNC: 19 U/L (ref 12–45)
BASOPHILS # BLD AUTO: 0.9 % (ref 0–1.8)
BASOPHILS # BLD: 0.06 K/UL (ref 0–0.12)
BILIRUB SERPL-MCNC: 0.2 MG/DL (ref 0.1–1.5)
BILIRUB UR QL STRIP.AUTO: NEGATIVE
BUN SERPL-MCNC: 11 MG/DL (ref 8–22)
CALCIUM ALBUM COR SERPL-MCNC: 8.5 MG/DL (ref 8.5–10.5)
CALCIUM SERPL-MCNC: 9.1 MG/DL (ref 8.5–10.5)
CHLORIDE SERPL-SCNC: 103 MMOL/L (ref 96–112)
CHOLEST SERPL-MCNC: 238 MG/DL (ref 100–199)
CO2 SERPL-SCNC: 25 MMOL/L (ref 20–33)
COLOR UR: YELLOW
CREAT SERPL-MCNC: 0.94 MG/DL (ref 0.5–1.4)
CREAT UR-MCNC: 134.5 MG/DL
EOSINOPHIL # BLD AUTO: 0.41 K/UL (ref 0–0.51)
EOSINOPHIL NFR BLD: 6.4 % (ref 0–6.9)
ERYTHROCYTE [DISTWIDTH] IN BLOOD BY AUTOMATED COUNT: 48.5 FL (ref 35.9–50)
FASTING STATUS PATIENT QL REPORTED: NORMAL
GFR SERPLBLD CREATININE-BSD FMLA CKD-EPI: 101 ML/MIN/1.73 M 2
GLOBULIN SER CALC-MCNC: 2.3 G/DL (ref 1.9–3.5)
GLUCOSE SERPL-MCNC: 108 MG/DL (ref 65–99)
GLUCOSE UR STRIP.AUTO-MCNC: NEGATIVE MG/DL
HCT VFR BLD AUTO: 49.9 % (ref 42–52)
HDLC SERPL-MCNC: 60 MG/DL
HGB BLD-MCNC: 16.9 G/DL (ref 14–18)
IMM GRANULOCYTES # BLD AUTO: 0.02 K/UL (ref 0–0.11)
IMM GRANULOCYTES NFR BLD AUTO: 0.3 % (ref 0–0.9)
KETONES UR STRIP.AUTO-MCNC: NEGATIVE MG/DL
LDLC SERPL CALC-MCNC: 156 MG/DL
LEUKOCYTE ESTERASE UR QL STRIP.AUTO: NEGATIVE
LYMPHOCYTES # BLD AUTO: 2.09 K/UL (ref 1–4.8)
LYMPHOCYTES NFR BLD: 32.7 % (ref 22–41)
MCH RBC QN AUTO: 32.6 PG (ref 27–33)
MCHC RBC AUTO-ENTMCNC: 33.9 G/DL (ref 32.3–36.5)
MCV RBC AUTO: 96.1 FL (ref 81.4–97.8)
MICRO URNS: NORMAL
MICROALBUMIN UR-MCNC: <1.2 MG/DL
MICROALBUMIN/CREAT UR: NORMAL MG/G (ref 0–30)
MONOCYTES # BLD AUTO: 0.59 K/UL (ref 0–0.85)
MONOCYTES NFR BLD AUTO: 9.2 % (ref 0–13.4)
NEUTROPHILS # BLD AUTO: 3.22 K/UL (ref 1.82–7.42)
NEUTROPHILS NFR BLD: 50.5 % (ref 44–72)
NITRITE UR QL STRIP.AUTO: NEGATIVE
NRBC # BLD AUTO: 0 K/UL
NRBC BLD-RTO: 0 /100 WBC (ref 0–0.2)
PH UR STRIP.AUTO: 6 [PH] (ref 5–8)
PLATELET # BLD AUTO: 278 K/UL (ref 164–446)
PMV BLD AUTO: 10.7 FL (ref 9–12.9)
POTASSIUM SERPL-SCNC: 4.4 MMOL/L (ref 3.6–5.5)
PROT SERPL-MCNC: 7 G/DL (ref 6–8.2)
PROT UR QL STRIP: NEGATIVE MG/DL
PSA SERPL-MCNC: 0.61 NG/ML (ref 0–4)
RBC # BLD AUTO: 5.19 M/UL (ref 4.7–6.1)
RBC UR QL AUTO: NEGATIVE
SODIUM SERPL-SCNC: 141 MMOL/L (ref 135–145)
SP GR UR STRIP.AUTO: 1.02
T3FREE SERPL-MCNC: 2.99 PG/ML (ref 2–4.4)
TRIGL SERPL-MCNC: 112 MG/DL (ref 0–149)
TSH SERPL DL<=0.005 MIU/L-ACNC: 1.83 UIU/ML (ref 0.38–5.33)
UROBILINOGEN UR STRIP.AUTO-MCNC: 0.2 MG/DL
WBC # BLD AUTO: 6.4 K/UL (ref 4.8–10.8)

## 2024-03-25 PROCEDURE — 80061 LIPID PANEL: CPT

## 2024-03-25 PROCEDURE — 82570 ASSAY OF URINE CREATININE: CPT

## 2024-03-25 PROCEDURE — 84443 ASSAY THYROID STIM HORMONE: CPT

## 2024-03-25 PROCEDURE — 84481 FREE ASSAY (FT-3): CPT

## 2024-03-25 PROCEDURE — 81003 URINALYSIS AUTO W/O SCOPE: CPT

## 2024-03-25 PROCEDURE — 84153 ASSAY OF PSA TOTAL: CPT

## 2024-03-25 PROCEDURE — 80053 COMPREHEN METABOLIC PANEL: CPT

## 2024-03-25 PROCEDURE — 85025 COMPLETE CBC W/AUTO DIFF WBC: CPT

## 2024-03-25 PROCEDURE — 84439 ASSAY OF FREE THYROXINE: CPT

## 2024-03-25 PROCEDURE — 36415 COLL VENOUS BLD VENIPUNCTURE: CPT

## 2024-03-25 PROCEDURE — 82306 VITAMIN D 25 HYDROXY: CPT

## 2024-03-25 PROCEDURE — 82043 UR ALBUMIN QUANTITATIVE: CPT

## 2024-03-29 LAB — T4 FREE SERPL DIALY-MCNC: 1.1 NG/DL (ref 1.1–2.4)

## 2024-10-11 PROBLEM — M16.12 OSTEOARTHRITIS OF LEFT HIP: Status: ACTIVE | Noted: 2024-10-11

## 2024-10-14 ENCOUNTER — HOSPITAL ENCOUNTER (OUTPATIENT)
Dept: LAB | Facility: MEDICAL CENTER | Age: 49
End: 2024-10-14
Attending: NURSE PRACTITIONER
Payer: COMMERCIAL

## 2024-10-14 LAB
25(OH)D3 SERPL-MCNC: 42 NG/ML (ref 30–100)
ALBUMIN SERPL BCP-MCNC: 4.3 G/DL (ref 3.2–4.9)
ALBUMIN/GLOB SERPL: 1.8 G/DL
ALP SERPL-CCNC: 67 U/L (ref 30–99)
ALT SERPL-CCNC: 21 U/L (ref 2–50)
ANION GAP SERPL CALC-SCNC: 10 MMOL/L (ref 7–16)
APPEARANCE UR: CLEAR
AST SERPL-CCNC: 21 U/L (ref 12–45)
BASOPHILS # BLD AUTO: 1.1 % (ref 0–1.8)
BASOPHILS # BLD: 0.05 K/UL (ref 0–0.12)
BILIRUB SERPL-MCNC: <0.2 MG/DL (ref 0.1–1.5)
BILIRUB UR QL STRIP.AUTO: NEGATIVE
BUN SERPL-MCNC: 15 MG/DL (ref 8–22)
CALCIUM ALBUM COR SERPL-MCNC: 8.9 MG/DL (ref 8.5–10.5)
CALCIUM SERPL-MCNC: 9.1 MG/DL (ref 8.5–10.5)
CHLORIDE SERPL-SCNC: 105 MMOL/L (ref 96–112)
CHOLEST SERPL-MCNC: 186 MG/DL (ref 100–199)
CO2 SERPL-SCNC: 24 MMOL/L (ref 20–33)
COLOR UR: YELLOW
CREAT SERPL-MCNC: 0.86 MG/DL (ref 0.5–1.4)
CREAT UR-MCNC: 101 MG/DL
EOSINOPHIL # BLD AUTO: 0.33 K/UL (ref 0–0.51)
EOSINOPHIL NFR BLD: 7 % (ref 0–6.9)
ERYTHROCYTE [DISTWIDTH] IN BLOOD BY AUTOMATED COUNT: 48.3 FL (ref 35.9–50)
GFR SERPLBLD CREATININE-BSD FMLA CKD-EPI: 107 ML/MIN/1.73 M 2
GLOBULIN SER CALC-MCNC: 2.4 G/DL (ref 1.9–3.5)
GLUCOSE SERPL-MCNC: 105 MG/DL (ref 65–99)
GLUCOSE UR STRIP.AUTO-MCNC: NEGATIVE MG/DL
HCT VFR BLD AUTO: 46.3 % (ref 42–52)
HDLC SERPL-MCNC: 67 MG/DL
HGB BLD-MCNC: 15.9 G/DL (ref 14–18)
IMM GRANULOCYTES # BLD AUTO: 0.03 K/UL (ref 0–0.11)
IMM GRANULOCYTES NFR BLD AUTO: 0.6 % (ref 0–0.9)
KETONES UR STRIP.AUTO-MCNC: NEGATIVE MG/DL
LDLC SERPL CALC-MCNC: 93 MG/DL
LEUKOCYTE ESTERASE UR QL STRIP.AUTO: NEGATIVE
LYMPHOCYTES # BLD AUTO: 1.49 K/UL (ref 1–4.8)
LYMPHOCYTES NFR BLD: 31.7 % (ref 22–41)
MCH RBC QN AUTO: 33.4 PG (ref 27–33)
MCHC RBC AUTO-ENTMCNC: 34.3 G/DL (ref 32.3–36.5)
MCV RBC AUTO: 97.3 FL (ref 81.4–97.8)
MICRO URNS: NORMAL
MICROALBUMIN UR-MCNC: <1.2 MG/DL
MICROALBUMIN/CREAT UR: NORMAL MG/G (ref 0–30)
MONOCYTES # BLD AUTO: 0.46 K/UL (ref 0–0.85)
MONOCYTES NFR BLD AUTO: 9.8 % (ref 0–13.4)
NEUTROPHILS # BLD AUTO: 2.34 K/UL (ref 1.82–7.42)
NEUTROPHILS NFR BLD: 49.8 % (ref 44–72)
NITRITE UR QL STRIP.AUTO: NEGATIVE
NRBC # BLD AUTO: 0 K/UL
NRBC BLD-RTO: 0 /100 WBC (ref 0–0.2)
PH UR STRIP.AUTO: 6 [PH] (ref 5–8)
PLATELET # BLD AUTO: 251 K/UL (ref 164–446)
PMV BLD AUTO: 10.8 FL (ref 9–12.9)
POTASSIUM SERPL-SCNC: 4.5 MMOL/L (ref 3.6–5.5)
PROT SERPL-MCNC: 6.7 G/DL (ref 6–8.2)
PROT UR QL STRIP: NEGATIVE MG/DL
RBC # BLD AUTO: 4.76 M/UL (ref 4.7–6.1)
RBC UR QL AUTO: NEGATIVE
SODIUM SERPL-SCNC: 139 MMOL/L (ref 135–145)
SP GR UR STRIP.AUTO: 1.02
TRIGL SERPL-MCNC: 132 MG/DL (ref 0–149)
UROBILINOGEN UR STRIP.AUTO-MCNC: 0.2 MG/DL
WBC # BLD AUTO: 4.7 K/UL (ref 4.8–10.8)

## 2024-10-14 PROCEDURE — 82570 ASSAY OF URINE CREATININE: CPT

## 2024-10-14 PROCEDURE — 80053 COMPREHEN METABOLIC PANEL: CPT

## 2024-10-14 PROCEDURE — 82043 UR ALBUMIN QUANTITATIVE: CPT

## 2024-10-14 PROCEDURE — 36415 COLL VENOUS BLD VENIPUNCTURE: CPT

## 2024-10-14 PROCEDURE — 82306 VITAMIN D 25 HYDROXY: CPT

## 2024-10-14 PROCEDURE — 81003 URINALYSIS AUTO W/O SCOPE: CPT

## 2024-10-14 PROCEDURE — 85025 COMPLETE CBC W/AUTO DIFF WBC: CPT

## 2024-10-14 PROCEDURE — 80061 LIPID PANEL: CPT

## 2024-10-21 ENCOUNTER — APPOINTMENT (RX ONLY)
Dept: URBAN - METROPOLITAN AREA CLINIC 22 | Facility: CLINIC | Age: 49
Setting detail: DERMATOLOGY
End: 2024-10-21

## 2024-10-21 DIAGNOSIS — L57.0 ACTINIC KERATOSIS: ICD-10-CM

## 2024-10-21 DIAGNOSIS — L81.4 OTHER MELANIN HYPERPIGMENTATION: ICD-10-CM

## 2024-10-21 DIAGNOSIS — D18.0 HEMANGIOMA: ICD-10-CM

## 2024-10-21 DIAGNOSIS — Z71.89 OTHER SPECIFIED COUNSELING: ICD-10-CM

## 2024-10-21 DIAGNOSIS — D22 MELANOCYTIC NEVI: ICD-10-CM

## 2024-10-21 PROBLEM — D18.01 HEMANGIOMA OF SKIN AND SUBCUTANEOUS TISSUE: Status: ACTIVE | Noted: 2024-10-21

## 2024-10-21 PROBLEM — D22.5 MELANOCYTIC NEVI OF TRUNK: Status: ACTIVE | Noted: 2024-10-21

## 2024-10-21 PROCEDURE — 99203 OFFICE O/P NEW LOW 30 MIN: CPT | Mod: 25

## 2024-10-21 PROCEDURE — 17000 DESTRUCT PREMALG LESION: CPT

## 2024-10-21 PROCEDURE — ? COUNSELING

## 2024-10-21 PROCEDURE — 17003 DESTRUCT PREMALG LES 2-14: CPT

## 2024-10-21 PROCEDURE — ? LIQUID NITROGEN

## 2024-10-21 PROCEDURE — ? SUNSCREEN RECOMMENDATIONS

## 2024-10-21 ASSESSMENT — LOCATION SIMPLE DESCRIPTION DERM
LOCATION SIMPLE: ABDOMEN
LOCATION SIMPLE: LEFT TEMPLE
LOCATION SIMPLE: LEFT UPPER BACK
LOCATION SIMPLE: RIGHT TEMPLE
LOCATION SIMPLE: LEFT FOREHEAD

## 2024-10-21 ASSESSMENT — LOCATION DETAILED DESCRIPTION DERM
LOCATION DETAILED: RIGHT CENTRAL TEMPLE
LOCATION DETAILED: EPIGASTRIC SKIN
LOCATION DETAILED: LEFT INFERIOR FOREHEAD
LOCATION DETAILED: LEFT MEDIAL TEMPLE
LOCATION DETAILED: LEFT SUPERIOR MEDIAL UPPER BACK

## 2024-10-21 ASSESSMENT — LOCATION ZONE DERM
LOCATION ZONE: TRUNK
LOCATION ZONE: FACE

## 2024-11-04 ENCOUNTER — HOSPITAL ENCOUNTER (OUTPATIENT)
Dept: RADIOLOGY | Facility: MEDICAL CENTER | Age: 49
End: 2024-11-04
Attending: NURSE PRACTITIONER
Payer: COMMERCIAL

## 2024-11-04 DIAGNOSIS — J45.20 ASTHMA, MILD INTERMITTENT, POORLY CONTROLLED: ICD-10-CM

## 2024-11-04 PROCEDURE — 71046 X-RAY EXAM CHEST 2 VIEWS: CPT

## 2024-12-08 PROBLEM — T88.59XA DELAYED EMERGENCE FROM ANESTHESIA: Status: ACTIVE | Noted: 2024-12-08

## 2025-04-03 ENCOUNTER — HOSPITAL ENCOUNTER (OUTPATIENT)
Dept: LAB | Facility: MEDICAL CENTER | Age: 50
End: 2025-04-03
Attending: NURSE PRACTITIONER
Payer: COMMERCIAL

## 2025-04-03 LAB
25(OH)D3 SERPL-MCNC: 40 NG/ML (ref 30–100)
ALBUMIN SERPL BCP-MCNC: 4.8 G/DL (ref 3.2–4.9)
ALBUMIN/GLOB SERPL: 2 G/DL
ALP SERPL-CCNC: 63 U/L (ref 30–99)
ALT SERPL-CCNC: 19 U/L (ref 2–50)
ANION GAP SERPL CALC-SCNC: 11 MMOL/L (ref 7–16)
APPEARANCE UR: CLEAR
AST SERPL-CCNC: 21 U/L (ref 12–45)
BASOPHILS # BLD AUTO: 1.5 % (ref 0–1.8)
BASOPHILS # BLD: 0.06 K/UL (ref 0–0.12)
BILIRUB SERPL-MCNC: 0.9 MG/DL (ref 0.1–1.5)
BILIRUB UR QL STRIP.AUTO: NEGATIVE
BUN SERPL-MCNC: 13 MG/DL (ref 8–22)
CALCIUM ALBUM COR SERPL-MCNC: 9 MG/DL (ref 8.5–10.5)
CALCIUM SERPL-MCNC: 9.6 MG/DL (ref 8.5–10.5)
CHLORIDE SERPL-SCNC: 103 MMOL/L (ref 96–112)
CO2 SERPL-SCNC: 25 MMOL/L (ref 20–33)
COLOR UR: ABNORMAL
CREAT SERPL-MCNC: 0.86 MG/DL (ref 0.5–1.4)
CREAT UR-MCNC: 247 MG/DL
EOSINOPHIL # BLD AUTO: 0.27 K/UL (ref 0–0.51)
EOSINOPHIL NFR BLD: 6.7 % (ref 0–6.9)
ERYTHROCYTE [DISTWIDTH] IN BLOOD BY AUTOMATED COUNT: 48.2 FL (ref 35.9–50)
EST. AVERAGE GLUCOSE BLD GHB EST-MCNC: 117 MG/DL
GFR SERPLBLD CREATININE-BSD FMLA CKD-EPI: 107 ML/MIN/1.73 M 2
GLOBULIN SER CALC-MCNC: 2.4 G/DL (ref 1.9–3.5)
GLUCOSE SERPL-MCNC: 109 MG/DL (ref 65–99)
GLUCOSE UR STRIP.AUTO-MCNC: NEGATIVE MG/DL
HBA1C MFR BLD: 5.7 % (ref 4–5.6)
HCT VFR BLD AUTO: 49.2 % (ref 42–52)
HGB BLD-MCNC: 16.6 G/DL (ref 14–18)
IMM GRANULOCYTES # BLD AUTO: 0.01 K/UL (ref 0–0.11)
IMM GRANULOCYTES NFR BLD AUTO: 0.2 % (ref 0–0.9)
KETONES UR STRIP.AUTO-MCNC: ABNORMAL MG/DL
LEUKOCYTE ESTERASE UR QL STRIP.AUTO: NEGATIVE
LYMPHOCYTES # BLD AUTO: 1.53 K/UL (ref 1–4.8)
LYMPHOCYTES NFR BLD: 37.9 % (ref 22–41)
MCH RBC QN AUTO: 31.7 PG (ref 27–33)
MCHC RBC AUTO-ENTMCNC: 33.7 G/DL (ref 32.3–36.5)
MCV RBC AUTO: 93.9 FL (ref 81.4–97.8)
MICRO URNS: ABNORMAL
MICROALBUMIN UR-MCNC: <1.2 MG/DL
MICROALBUMIN/CREAT UR: NORMAL MG/G (ref 0–30)
MONOCYTES # BLD AUTO: 0.41 K/UL (ref 0–0.85)
MONOCYTES NFR BLD AUTO: 10.1 % (ref 0–13.4)
NEUTROPHILS # BLD AUTO: 1.76 K/UL (ref 1.82–7.42)
NEUTROPHILS NFR BLD: 43.6 % (ref 44–72)
NITRITE UR QL STRIP.AUTO: NEGATIVE
NRBC # BLD AUTO: 0 K/UL
NRBC BLD-RTO: 0 /100 WBC (ref 0–0.2)
PH UR STRIP.AUTO: 6 [PH] (ref 5–8)
PLATELET # BLD AUTO: 259 K/UL (ref 164–446)
PMV BLD AUTO: 10.7 FL (ref 9–12.9)
POTASSIUM SERPL-SCNC: 4.4 MMOL/L (ref 3.6–5.5)
PROT SERPL-MCNC: 7.2 G/DL (ref 6–8.2)
PROT UR QL STRIP: NEGATIVE MG/DL
PSA SERPL DL<=0.01 NG/ML-MCNC: 0.59 NG/ML (ref 0–4)
RBC # BLD AUTO: 5.24 M/UL (ref 4.7–6.1)
RBC UR QL AUTO: NEGATIVE
SODIUM SERPL-SCNC: 139 MMOL/L (ref 135–145)
SP GR UR STRIP.AUTO: 1.03
T3FREE SERPL-MCNC: 3.36 PG/ML (ref 2–4.4)
TSH SERPL-ACNC: 1.08 UIU/ML (ref 0.38–5.33)
UROBILINOGEN UR STRIP.AUTO-MCNC: 1 EU/DL
WBC # BLD AUTO: 4 K/UL (ref 4.8–10.8)

## 2025-04-03 PROCEDURE — 36415 COLL VENOUS BLD VENIPUNCTURE: CPT

## 2025-04-03 PROCEDURE — 84153 ASSAY OF PSA TOTAL: CPT

## 2025-04-03 PROCEDURE — 83036 HEMOGLOBIN GLYCOSYLATED A1C: CPT

## 2025-04-03 PROCEDURE — 82043 UR ALBUMIN QUANTITATIVE: CPT

## 2025-04-03 PROCEDURE — 82306 VITAMIN D 25 HYDROXY: CPT

## 2025-04-03 PROCEDURE — 81003 URINALYSIS AUTO W/O SCOPE: CPT

## 2025-04-03 PROCEDURE — 83704 LIPOPROTEIN BLD QUAN PART: CPT

## 2025-04-03 PROCEDURE — 80061 LIPID PANEL: CPT

## 2025-04-03 PROCEDURE — 80053 COMPREHEN METABOLIC PANEL: CPT

## 2025-04-03 PROCEDURE — 84481 FREE ASSAY (FT-3): CPT

## 2025-04-03 PROCEDURE — 82570 ASSAY OF URINE CREATININE: CPT

## 2025-04-03 PROCEDURE — 85025 COMPLETE CBC W/AUTO DIFF WBC: CPT

## 2025-04-03 PROCEDURE — 84443 ASSAY THYROID STIM HORMONE: CPT

## 2025-04-03 PROCEDURE — 84439 ASSAY OF FREE THYROXINE: CPT

## 2025-04-06 LAB — T4 FREE SERPL DIALY-MCNC: 2 NG/DL (ref 1.1–2.4)

## 2025-04-07 LAB
CHOLEST SERPL-MCNC: 208 MG/DL
HDL PARTICAL NO Q4363: 37.2 UMOL/L
HDL SIZE Q4361: 9.1 NM
HDLC SERPL-MCNC: 62 MG/DL (ref 40–59)
HLD.LARGE SERPL-SCNC: 6.8 UMOL/L
L VLDL PART NO Q4357: 2.7 NMOL/L
LDL SERPL QN: 20.8 NM
LDL SERPL-SCNC: 1384 NMOL/L
LDL SMALL SERPL-SCNC: 608 NMOL/L
LDLC SERPL CALC-MCNC: 134 MG/DL
PATHOLOGY STUDY: ABNORMAL
TRIGL SERPL-MCNC: 62 MG/DL (ref 30–149)
VLDL SIZE Q4362: 48.4 NM

## 2025-08-03 ENCOUNTER — OFFICE VISIT (OUTPATIENT)
Dept: URGENT CARE | Facility: PHYSICIAN GROUP | Age: 50
End: 2025-08-03
Payer: COMMERCIAL

## 2025-08-03 VITALS
OXYGEN SATURATION: 98 % | HEART RATE: 70 BPM | HEIGHT: 69 IN | BODY MASS INDEX: 26.12 KG/M2 | RESPIRATION RATE: 16 BRPM | WEIGHT: 176.37 LBS | TEMPERATURE: 97.9 F | SYSTOLIC BLOOD PRESSURE: 120 MMHG | DIASTOLIC BLOOD PRESSURE: 68 MMHG

## 2025-08-03 DIAGNOSIS — H10.9 BACTERIAL CONJUNCTIVITIS OF RIGHT EYE: Primary | ICD-10-CM

## 2025-08-03 DIAGNOSIS — H00.011 HORDEOLUM EXTERNUM OF RIGHT UPPER EYELID: ICD-10-CM

## 2025-08-03 PROCEDURE — 3074F SYST BP LT 130 MM HG: CPT

## 2025-08-03 PROCEDURE — 3078F DIAST BP <80 MM HG: CPT

## 2025-08-03 PROCEDURE — 99203 OFFICE O/P NEW LOW 30 MIN: CPT

## 2025-08-03 RX ORDER — POLYMYXIN B SULFATE AND TRIMETHOPRIM 1; 10000 MG/ML; [USP'U]/ML
1 SOLUTION OPHTHALMIC 4 TIMES DAILY
Qty: 10 ML | Refills: 0 | Status: SHIPPED | OUTPATIENT
Start: 2025-08-03 | End: 2025-08-08

## 2025-08-03 ASSESSMENT — ENCOUNTER SYMPTOMS
FEVER: 0
EYE DISCHARGE: 1
BLURRED VISION: 0
PHOTOPHOBIA: 0
EYE REDNESS: 1
EYE PAIN: 0

## 2025-08-03 ASSESSMENT — FIBROSIS 4 INDEX: FIB4 SCORE: 0.87

## 2025-08-03 ASSESSMENT — VISUAL ACUITY: OU: 1

## 2025-08-07 ENCOUNTER — APPOINTMENT (OUTPATIENT)
Dept: ADMISSIONS | Facility: MEDICAL CENTER | Age: 50
End: 2025-08-07
Attending: SURGERY
Payer: COMMERCIAL

## 2025-08-11 ENCOUNTER — PRE-ADMISSION TESTING (OUTPATIENT)
Dept: ADMISSIONS | Facility: MEDICAL CENTER | Age: 50
End: 2025-08-11
Attending: SURGERY
Payer: COMMERCIAL

## 2025-08-21 ENCOUNTER — HOSPITAL ENCOUNTER (OUTPATIENT)
Facility: MEDICAL CENTER | Age: 50
End: 2025-08-21
Attending: SURGERY | Admitting: SURGERY
Payer: COMMERCIAL

## 2025-08-21 ENCOUNTER — ANESTHESIA (OUTPATIENT)
Dept: SURGERY | Facility: MEDICAL CENTER | Age: 50
End: 2025-08-21
Payer: COMMERCIAL

## 2025-08-21 ENCOUNTER — ANESTHESIA EVENT (OUTPATIENT)
Dept: SURGERY | Facility: MEDICAL CENTER | Age: 50
End: 2025-08-21
Payer: COMMERCIAL

## 2025-08-21 VITALS
WEIGHT: 173.06 LBS | SYSTOLIC BLOOD PRESSURE: 131 MMHG | HEART RATE: 73 BPM | BODY MASS INDEX: 25.63 KG/M2 | OXYGEN SATURATION: 94 % | TEMPERATURE: 98.2 F | RESPIRATION RATE: 14 BRPM | DIASTOLIC BLOOD PRESSURE: 72 MMHG | HEIGHT: 69 IN

## 2025-08-21 DIAGNOSIS — G89.18 POST-OP PAIN: Primary | ICD-10-CM

## 2025-08-21 LAB — PATHOLOGY CONSULT NOTE: NORMAL

## 2025-08-21 PROCEDURE — 88304 TISSUE EXAM BY PATHOLOGIST: CPT | Mod: 59 | Performed by: PATHOLOGY

## 2025-08-21 PROCEDURE — 160048 HCHG OR STATISTICAL LEVEL 1-5: Performed by: SURGERY

## 2025-08-21 PROCEDURE — 160193 HCHG PACU STANDARD - 1ST 60 MINS: Performed by: SURGERY

## 2025-08-21 PROCEDURE — 700102 HCHG RX REV CODE 250 W/ 637 OVERRIDE(OP): Performed by: STUDENT IN AN ORGANIZED HEALTH CARE EDUCATION/TRAINING PROGRAM

## 2025-08-21 PROCEDURE — 160047 HCHG PACU  - EA ADDL 30 MINS PHASE II: Performed by: SURGERY

## 2025-08-21 PROCEDURE — 160015 HCHG STAT PREOP MINUTES: Performed by: SURGERY

## 2025-08-21 PROCEDURE — 160046 HCHG PACU - 1ST 60 MINS PHASE II: Performed by: SURGERY

## 2025-08-21 PROCEDURE — 160038 HCHG SURGERY MINUTES - EA ADDL 1 MIN LEVEL 2: Performed by: SURGERY

## 2025-08-21 PROCEDURE — 160027 HCHG SURGERY MINUTES - 1ST 30 MINS LEVEL 2: Performed by: SURGERY

## 2025-08-21 PROCEDURE — 700111 HCHG RX REV CODE 636 W/ 250 OVERRIDE (IP): Performed by: STUDENT IN AN ORGANIZED HEALTH CARE EDUCATION/TRAINING PROGRAM

## 2025-08-21 PROCEDURE — 700101 HCHG RX REV CODE 250: Performed by: STUDENT IN AN ORGANIZED HEALTH CARE EDUCATION/TRAINING PROGRAM

## 2025-08-21 PROCEDURE — 700111 HCHG RX REV CODE 636 W/ 250 OVERRIDE (IP): Mod: JZ | Performed by: STUDENT IN AN ORGANIZED HEALTH CARE EDUCATION/TRAINING PROGRAM

## 2025-08-21 PROCEDURE — A9270 NON-COVERED ITEM OR SERVICE: HCPCS | Performed by: STUDENT IN AN ORGANIZED HEALTH CARE EDUCATION/TRAINING PROGRAM

## 2025-08-21 PROCEDURE — 88304 TISSUE EXAM BY PATHOLOGIST: CPT | Mod: 26 | Performed by: PATHOLOGY

## 2025-08-21 PROCEDURE — 160002 HCHG RECOVERY MINUTES (STAT): Performed by: SURGERY

## 2025-08-21 PROCEDURE — 160025 RECOVERY II MINUTES (STATS): Performed by: SURGERY

## 2025-08-21 PROCEDURE — 700111 HCHG RX REV CODE 636 W/ 250 OVERRIDE (IP): Mod: JZ | Performed by: SURGERY

## 2025-08-21 PROCEDURE — 160191 HCHG ANESTHESIA STANDARD: Performed by: SURGERY

## 2025-08-21 PROCEDURE — 700105 HCHG RX REV CODE 258: Performed by: SURGERY

## 2025-08-21 PROCEDURE — 160194 HCHG PACU STANDARD - EA ADDL 30 MINS: Performed by: SURGERY

## 2025-08-21 RX ORDER — CEFAZOLIN SODIUM 1 G/3ML
INJECTION, POWDER, FOR SOLUTION INTRAMUSCULAR; INTRAVENOUS PRN
Status: DISCONTINUED | OUTPATIENT
Start: 2025-08-21 | End: 2025-08-21 | Stop reason: SURG

## 2025-08-21 RX ORDER — GABAPENTIN 300 MG/1
300 CAPSULE ORAL 3 TIMES DAILY
Qty: 15 CAPSULE | Refills: 0 | Status: SHIPPED | OUTPATIENT
Start: 2025-08-21 | End: 2025-08-26

## 2025-08-21 RX ORDER — ACETAMINOPHEN 500 MG
1000 TABLET ORAL ONCE
Status: COMPLETED | OUTPATIENT
Start: 2025-08-21 | End: 2025-08-21

## 2025-08-21 RX ORDER — SODIUM CHLORIDE, SODIUM LACTATE, POTASSIUM CHLORIDE, CALCIUM CHLORIDE 600; 310; 30; 20 MG/100ML; MG/100ML; MG/100ML; MG/100ML
INJECTION, SOLUTION INTRAVENOUS CONTINUOUS
Status: ACTIVE | OUTPATIENT
Start: 2025-08-21 | End: 2025-08-21

## 2025-08-21 RX ORDER — HYDROMORPHONE HYDROCHLORIDE 1 MG/ML
0.4 INJECTION, SOLUTION INTRAMUSCULAR; INTRAVENOUS; SUBCUTANEOUS
Status: DISCONTINUED | OUTPATIENT
Start: 2025-08-21 | End: 2025-08-21 | Stop reason: HOSPADM

## 2025-08-21 RX ORDER — CELECOXIB 200 MG/1
200 CAPSULE ORAL 2 TIMES DAILY
Qty: 8 CAPSULE | Refills: 0 | Status: SHIPPED | OUTPATIENT
Start: 2025-08-21 | End: 2025-08-25

## 2025-08-21 RX ORDER — ONDANSETRON 2 MG/ML
4 INJECTION INTRAMUSCULAR; INTRAVENOUS
Status: COMPLETED | OUTPATIENT
Start: 2025-08-21 | End: 2025-08-21

## 2025-08-21 RX ORDER — DIPHENHYDRAMINE HYDROCHLORIDE 50 MG/ML
12.5 INJECTION, SOLUTION INTRAMUSCULAR; INTRAVENOUS
Status: DISCONTINUED | OUTPATIENT
Start: 2025-08-21 | End: 2025-08-21 | Stop reason: HOSPADM

## 2025-08-21 RX ORDER — LIDOCAINE HYDROCHLORIDE 40 MG/ML
SOLUTION TOPICAL PRN
Status: DISCONTINUED | OUTPATIENT
Start: 2025-08-21 | End: 2025-08-21 | Stop reason: SURG

## 2025-08-21 RX ORDER — OXYCODONE HYDROCHLORIDE 5 MG/1
5 TABLET ORAL EVERY 6 HOURS PRN
Qty: 12 TABLET | Refills: 0 | Status: SHIPPED | OUTPATIENT
Start: 2025-08-21 | End: 2025-08-24

## 2025-08-21 RX ORDER — HYDROMORPHONE HYDROCHLORIDE 1 MG/ML
0.1 INJECTION, SOLUTION INTRAMUSCULAR; INTRAVENOUS; SUBCUTANEOUS
Status: DISCONTINUED | OUTPATIENT
Start: 2025-08-21 | End: 2025-08-21 | Stop reason: HOSPADM

## 2025-08-21 RX ORDER — OXYCODONE HCL 5 MG/5 ML
5 SOLUTION, ORAL ORAL
Status: COMPLETED | OUTPATIENT
Start: 2025-08-21 | End: 2025-08-21

## 2025-08-21 RX ORDER — ROCURONIUM BROMIDE 10 MG/ML
INJECTION, SOLUTION INTRAVENOUS PRN
Status: DISCONTINUED | OUTPATIENT
Start: 2025-08-21 | End: 2025-08-21 | Stop reason: SURG

## 2025-08-21 RX ORDER — CETIRIZINE HYDROCHLORIDE 10 MG/1
10 TABLET ORAL DAILY
COMMUNITY

## 2025-08-21 RX ORDER — LIDOCAINE HYDROCHLORIDE 20 MG/ML
INJECTION, SOLUTION EPIDURAL; INFILTRATION; INTRACAUDAL; PERINEURAL PRN
Status: DISCONTINUED | OUTPATIENT
Start: 2025-08-21 | End: 2025-08-21 | Stop reason: SURG

## 2025-08-21 RX ORDER — HALOPERIDOL 5 MG/ML
1 INJECTION INTRAMUSCULAR
Status: DISCONTINUED | OUTPATIENT
Start: 2025-08-21 | End: 2025-08-21 | Stop reason: HOSPADM

## 2025-08-21 RX ORDER — MIDAZOLAM HYDROCHLORIDE 1 MG/ML
INJECTION INTRAMUSCULAR; INTRAVENOUS PRN
Status: DISCONTINUED | OUTPATIENT
Start: 2025-08-21 | End: 2025-08-21 | Stop reason: SURG

## 2025-08-21 RX ORDER — MEPERIDINE HYDROCHLORIDE 50 MG/ML
12.5 INJECTION INTRAMUSCULAR; INTRAVENOUS; SUBCUTANEOUS
Status: DISCONTINUED | OUTPATIENT
Start: 2025-08-21 | End: 2025-08-21 | Stop reason: HOSPADM

## 2025-08-21 RX ORDER — OXYCODONE HCL 5 MG/5 ML
10 SOLUTION, ORAL ORAL
Status: COMPLETED | OUTPATIENT
Start: 2025-08-21 | End: 2025-08-21

## 2025-08-21 RX ORDER — ONDANSETRON 2 MG/ML
INJECTION INTRAMUSCULAR; INTRAVENOUS PRN
Status: DISCONTINUED | OUTPATIENT
Start: 2025-08-21 | End: 2025-08-21 | Stop reason: SURG

## 2025-08-21 RX ORDER — SODIUM CHLORIDE, SODIUM LACTATE, POTASSIUM CHLORIDE, CALCIUM CHLORIDE 600; 310; 30; 20 MG/100ML; MG/100ML; MG/100ML; MG/100ML
INJECTION, SOLUTION INTRAVENOUS CONTINUOUS
Status: DISCONTINUED | OUTPATIENT
Start: 2025-08-21 | End: 2025-08-21 | Stop reason: HOSPADM

## 2025-08-21 RX ORDER — DEXAMETHASONE SODIUM PHOSPHATE 4 MG/ML
INJECTION, SOLUTION INTRA-ARTICULAR; INTRALESIONAL; INTRAMUSCULAR; INTRAVENOUS; SOFT TISSUE PRN
Status: DISCONTINUED | OUTPATIENT
Start: 2025-08-21 | End: 2025-08-21 | Stop reason: SURG

## 2025-08-21 RX ORDER — HYDROMORPHONE HYDROCHLORIDE 1 MG/ML
0.2 INJECTION, SOLUTION INTRAMUSCULAR; INTRAVENOUS; SUBCUTANEOUS
Status: DISCONTINUED | OUTPATIENT
Start: 2025-08-21 | End: 2025-08-21 | Stop reason: HOSPADM

## 2025-08-21 RX ADMIN — SUGAMMADEX 200 MG: 100 INJECTION, SOLUTION INTRAVENOUS at 12:16

## 2025-08-21 RX ADMIN — FENTANYL CITRATE 50 MCG: 50 INJECTION, SOLUTION INTRAMUSCULAR; INTRAVENOUS at 12:16

## 2025-08-21 RX ADMIN — FENTANYL CITRATE 50 MCG: 50 INJECTION, SOLUTION INTRAMUSCULAR; INTRAVENOUS at 11:38

## 2025-08-21 RX ADMIN — FENTANYL CITRATE 50 MCG: 50 INJECTION, SOLUTION INTRAMUSCULAR; INTRAVENOUS at 13:41

## 2025-08-21 RX ADMIN — DEXAMETHASONE SODIUM PHOSPHATE 8 MG: 4 INJECTION INTRA-ARTICULAR; INTRALESIONAL; INTRAMUSCULAR; INTRAVENOUS; SOFT TISSUE at 11:44

## 2025-08-21 RX ADMIN — FENTANYL CITRATE 50 MCG: 50 INJECTION, SOLUTION INTRAMUSCULAR; INTRAVENOUS at 13:27

## 2025-08-21 RX ADMIN — ONDANSETRON 4 MG: 2 INJECTION INTRAMUSCULAR; INTRAVENOUS at 15:24

## 2025-08-21 RX ADMIN — HYDROMORPHONE HYDROCHLORIDE 0.2 MG: 1 INJECTION, SOLUTION INTRAMUSCULAR; INTRAVENOUS; SUBCUTANEOUS at 14:19

## 2025-08-21 RX ADMIN — ROCURONIUM BROMIDE 50 MG: 10 INJECTION INTRAVENOUS at 11:40

## 2025-08-21 RX ADMIN — HYDROMORPHONE HYDROCHLORIDE 0.4 MG: 1 INJECTION, SOLUTION INTRAMUSCULAR; INTRAVENOUS; SUBCUTANEOUS at 14:11

## 2025-08-21 RX ADMIN — OXYCODONE HYDROCHLORIDE 10 MG: 5 SOLUTION ORAL at 13:05

## 2025-08-21 RX ADMIN — CEFAZOLIN 2 G: 1 INJECTION, POWDER, FOR SOLUTION INTRAMUSCULAR; INTRAVENOUS at 11:44

## 2025-08-21 RX ADMIN — PROPOFOL 150 MG: 10 INJECTION, EMULSION INTRAVENOUS at 11:40

## 2025-08-21 RX ADMIN — LIDOCAINE HYDROCHLORIDE 40 MG: 20 INJECTION, SOLUTION EPIDURAL; INFILTRATION; INTRACAUDAL; PERINEURAL at 11:40

## 2025-08-21 RX ADMIN — SODIUM CHLORIDE, POTASSIUM CHLORIDE, SODIUM LACTATE AND CALCIUM CHLORIDE: 600; 310; 30; 20 INJECTION, SOLUTION INTRAVENOUS at 11:36

## 2025-08-21 RX ADMIN — HYDROMORPHONE HYDROCHLORIDE 0.4 MG: 1 INJECTION, SOLUTION INTRAMUSCULAR; INTRAVENOUS; SUBCUTANEOUS at 13:57

## 2025-08-21 RX ADMIN — ONDANSETRON 4 MG: 2 INJECTION INTRAMUSCULAR; INTRAVENOUS at 12:03

## 2025-08-21 RX ADMIN — MIDAZOLAM HYDROCHLORIDE 1 MG: 1 INJECTION, SOLUTION INTRAMUSCULAR; INTRAVENOUS at 11:37

## 2025-08-21 RX ADMIN — ACETAMINOPHEN 1000 MG: 500 TABLET ORAL at 10:31

## 2025-08-21 RX ADMIN — LIDOCAINE HYDROCHLORIDE 4 ML: 40 SOLUTION TOPICAL at 11:40

## 2025-08-21 ASSESSMENT — PAIN DESCRIPTION - PAIN TYPE
TYPE: SURGICAL PAIN
TYPE: ACUTE PAIN;SURGICAL PAIN
TYPE: ACUTE PAIN;SURGICAL PAIN
TYPE: SURGICAL PAIN
TYPE: ACUTE PAIN
TYPE: ACUTE PAIN
TYPE: SURGICAL PAIN
TYPE: ACUTE PAIN;SURGICAL PAIN

## 2025-08-21 ASSESSMENT — FIBROSIS 4 INDEX: FIB4 SCORE: 0.87

## (undated) DEVICE — SUTURE 3-0 VICRYL PLUS SH - 27 INCH (36/BX)

## (undated) DEVICE — TUBING SET IRRIG XOMED (NEW) - (4/PK)

## (undated) DEVICE — SENSOR OXIMETER ADULT SPO2 RD SET (20EA/BX)

## (undated) DEVICE — DRAPE LAPAROTOMY T SHEET - (12EA/CA)

## (undated) DEVICE — SUTURE GENERAL

## (undated) DEVICE — PACK ENT OR - (2EA/CA)

## (undated) DEVICE — SLEEVE VASO DVT COMPRESSION CALF MED - (10PR/CA)

## (undated) DEVICE — WATER IRRIGATION STERILE 1000ML (12EA/CA)

## (undated) DEVICE — DRAPE UNDER BUTTOCKS FLUID - (20/CA)

## (undated) DEVICE — CANNULA O2 COMFORT SOFT EAR ADULT 7 FT TUBING (50/CA)

## (undated) DEVICE — TUBING CLEARLINK DUO-VENT - C-FLO (48EA/CA)

## (undated) DEVICE — GAUZE FLUFF STERILE 2-PLY 36 X 36 (100EA/CA)

## (undated) DEVICE — SYRINGE DISP. 60 CC LL - (30/BX, 12BX/CA)**WHEN THESE ARE GONE ORDER #500206**

## (undated) DEVICE — GOWN WARMING STANDARD FLEX - (30/CA)

## (undated) DEVICE — BLADE ROTATABLE STRAIGHT 13CM

## (undated) DEVICE — TUBE E-T HI-LO CUFF 7.0MM (10EA/PK)

## (undated) DEVICE — GLOVE BIOGEL INDICATOR SZ 7.5 SURGICAL PF LTX - (50PR/BX 4BX/CA)

## (undated) DEVICE — BOVIE FOOT CONTROL SUCTION - 6IN 10FR (25EA/CA)

## (undated) DEVICE — SET LEADWIRE 5 LEAD BEDSIDE DISPOSABLE ECG (1SET OF 5/EA)

## (undated) DEVICE — LEGGING LITHOTOMY 31 X 48 IN - (2EA/PK 20PK/CA)

## (undated) DEVICE — SODIUM CHL IRRIGATION 0.9% 1000ML (12EA/CA)

## (undated) DEVICE — BLADE INFERIOR TURBINATE 2MM  (5EA/PK)

## (undated) DEVICE — TOWELS CLOTH SURGICAL - (4/PK 20PK/CA)

## (undated) DEVICE — TOWEL STOP TIMEOUT SAFETY FLAG (40EA/CA)

## (undated) DEVICE — TRACKER ENT PATIENT

## (undated) DEVICE — ELECTRODE DUAL RETURN W/ CORD - (50/PK)

## (undated) DEVICE — GOWN SURGEONS X-LARGE - DISP. (30/CA)

## (undated) DEVICE — CANISTER SUCTION RIGID RED 1500CC (40EA/CA)

## (undated) DEVICE — LACTATED RINGERS INJ 1000 ML - (14EA/CA 60CA/PF)

## (undated) DEVICE — TUBE CONNECTING SUCTION - CLEAR PLASTIC STERILE 72 IN (50EA/CA)

## (undated) DEVICE — GLOVE BIOGEL PI INDICATOR SZ 6.5 SURGICAL PF LF - (50/BX 4BX/CA)

## (undated) DEVICE — SPECIMEN PLASTIC CONVERTOR - (10/CA)

## (undated) DEVICE — SPONGE GAUZESTER. 2X2 4-PL - (2/PK 50PK/BX 30BX/CS)

## (undated) DEVICE — SUCTION INSTRUMENT YANKAUER BULBOUS TIP W/O VENT (50EA/CA)

## (undated) DEVICE — HEMOSTAT SURG ABSORBABLE - 4 X 8 IN SURGICEL (24EA/CA)

## (undated) DEVICE — PACK MINOR BASIN - (4EA/CA)

## (undated) DEVICE — JELLY SURGILUBE STERILE TUBE 4.25 OZ (1/EA)

## (undated) DEVICE — BRIEF STRETCH MATERNITY M/L - FITS 20-60IN (5EA/BG 20BG/CA)

## (undated) DEVICE — CANISTER SUCTION 3000ML MECHANICAL FILTER AUTO SHUTOFF MEDI-VAC NONSTERILE LF DISP  (40EA/CA)

## (undated) DEVICE — SPONGE GAUZESTER 4 X 4 4PLY - (128PK/CA)

## (undated) DEVICE — GLOVE BIOGEL INDICATOR SZ 7SURGICAL PF LTX - (50/BX 4BX/CA)

## (undated) DEVICE — TRACKER ENT INSTRUMENT

## (undated) DEVICE — Device

## (undated) DEVICE — ARMREST CRADLE FOAM - (2PR/PK 12PR/CA)

## (undated) DEVICE — KIT  I.V. START (100EA/CA)

## (undated) DEVICE — ANTI-FOG SOLUTION - 60BTL/CA

## (undated) DEVICE — SLEEVE VASO CALF MED - (10PR/CA)

## (undated) DEVICE — GLOVE SZ 6 BIOGEL PI MICRO - PF LF (50PR/BX 4BX/CA)

## (undated) DEVICE — VESSELOOP MAXI BLUE STERILE- SURG-I-LOOP (10EA/BX)

## (undated) DEVICE — COVER LIGHT HANDLE ALC PLUS DISP (18EA/BX)

## (undated) DEVICE — DRESSING MASS EYE & EAR SIZE 2 (2EA/PK  50PK/BX  100EA/BX)

## (undated) DEVICE — SPONGE NASAL MRCL 400402 (20EA/PK)

## (undated) DEVICE — GLOVE BIOGEL SZ 7.5 SURGICAL PF LTX - (50PR/BX 4BX/CA)

## (undated) DEVICE — SOD. CHL. INJ. 0.9% 250 ML - (36/CA 50CA/PF)

## (undated) DEVICE — BLADE STRAIGHT SINUS (5EA/PK)

## (undated) DEVICE — TRAY SRGPRP PVP IOD WT PRP - (20/CA)

## (undated) DEVICE — GLOVE SZ 6.5 BIOGEL PI MICRO - PF LF (50PR/BX)

## (undated) DEVICE — CANISTER SUCTION 3000ML MECHANICAL FILTER AUTO SHUTOFF MEDI-VAC NONSTERILE LF DISP (40EA/CA)

## (undated) DEVICE — SUTURE 4-0 PLAIN GUT SC-1 ETHICON (36PK/BX)

## (undated) DEVICE — PATTIES SURG X-RAYCOTTONOID - 1/2 X 3 IN (200/CA)

## (undated) DEVICE — GLOVE BIOGEL SZ 6.5 SURGICAL PF LTX (50PR/BX 4BX/CA)

## (undated) DEVICE — SET EXTENSION WITH 2 PORTS (48EA/CA) ***PART #2C8610 IS A SUBSTITUTE*****

## (undated) DEVICE — MASK OXYGEN VNYL ADLT MED CONC WITH 7 FOOT TUBING  - (50EA/CA)

## (undated) DEVICE — WIPE SURGICAL INSTRUMENT VISIWIPE 2-7/8IN X 2-7/8IN (20EA/PK)

## (undated) DEVICE — NEEDLE SPINAL NON-SAFETY 25GA X 3 (25EA/BX)"

## (undated) DEVICE — SUTURE 0 COATED VICRYL 6-18IN - (12PK/BX)

## (undated) DEVICE — TUBING ENDOSCRUB II (5EA/PK)

## (undated) DEVICE — GLOVE BIOGEL PI ORTHO SZ 7.5 PF LF (40PR/BX)

## (undated) DEVICE — SHEATH SHARP SITE 30 DEGREE ENDOSCRUB II (5EA/PK)